# Patient Record
Sex: FEMALE | Race: WHITE | NOT HISPANIC OR LATINO | Employment: FULL TIME | RURAL
[De-identification: names, ages, dates, MRNs, and addresses within clinical notes are randomized per-mention and may not be internally consistent; named-entity substitution may affect disease eponyms.]

---

## 2017-03-28 ENCOUNTER — OFFICE VISIT (OUTPATIENT)
Dept: RETAIL CLINIC | Facility: CLINIC | Age: 42
End: 2017-03-28

## 2017-03-28 VITALS — RESPIRATION RATE: 20 BRPM | OXYGEN SATURATION: 100 % | TEMPERATURE: 98.6 F | WEIGHT: 154.2 LBS | HEART RATE: 91 BPM

## 2017-03-28 DIAGNOSIS — R11.2 NAUSEA AND VOMITING, INTRACTABILITY OF VOMITING NOT SPECIFIED, UNSPECIFIED VOMITING TYPE: Primary | ICD-10-CM

## 2017-03-28 DIAGNOSIS — R19.7 DIARRHEA, UNSPECIFIED TYPE: ICD-10-CM

## 2017-03-28 LAB
EXPIRATION DATE: NORMAL
FLUAV AG NPH QL: NORMAL
FLUBV AG NPH QL: NORMAL
INTERNAL CONTROL: NORMAL
Lab: NORMAL

## 2017-03-28 PROCEDURE — 99213 OFFICE O/P EST LOW 20 MIN: CPT | Performed by: NURSE PRACTITIONER

## 2017-03-28 PROCEDURE — 87804 INFLUENZA ASSAY W/OPTIC: CPT | Performed by: NURSE PRACTITIONER

## 2017-03-28 RX ORDER — ONDANSETRON 4 MG/1
4 TABLET, FILM COATED ORAL EVERY 8 HOURS PRN
Qty: 12 TABLET | Refills: 0 | Status: SHIPPED | OUTPATIENT
Start: 2017-03-28 | End: 2019-03-08

## 2017-03-28 RX ORDER — CITALOPRAM 20 MG/1
20 TABLET ORAL DAILY
COMMUNITY
End: 2019-03-08

## 2017-03-28 RX ORDER — OMEPRAZOLE 10 MG/1
10 CAPSULE, DELAYED RELEASE ORAL DAILY
COMMUNITY
End: 2019-03-08

## 2017-03-28 RX ORDER — LORATADINE 10 MG/1
10 TABLET ORAL DAILY
COMMUNITY
End: 2019-03-26

## 2017-03-28 NOTE — PATIENT INSTRUCTIONS
Diarrhea  Diarrhea is watery poop (stool). It can make you feel weak, tired, thirsty, or give you a dry mouth (signs of dehydration). Watery poop is a sign of another problem, most often an infection. It often lasts 2-3 days. It can last longer if it is a sign of something serious. Take care of yourself as told by your doctor.  HOME CARE   · Drink 1 cup (8 ounces) of fluid each time you have watery poop.  · Do not drink the following fluids:    Those that contain simple sugars (fructose, glucose, galactose, lactose, sucrose, maltose).    Sports drinks.    Fruit juices.    Whole milk products.    Sodas.    Drinks with caffeine (coffee, tea, soda) or alcohol.  · Oral rehydration solution may be used if the doctor says it is okay. You may make your own solution. Follow this recipe:    ?-? teaspoon table salt.    ¾ teaspoon baking soda.    ? teaspoon salt substitute containing potassium chloride.    1 ? tablespoons sugar.    1 liter (34 ounces) of water.  · Avoid the following foods:    High fiber foods, such as raw fruits and vegetables.    Nuts, seeds, and whole grain breads and cereals.     Those that are sweetened with sugar alcohols (xylitol, sorbitol, mannitol).  · Try eating the following foods:    Starchy foods, such as rice, toast, pasta, low-sugar cereal, oatmeal, baked potatoes, crackers, and bagels.    Bananas.    Applesauce.  · Eat probiotic-rich foods, such as yogurt and milk products that are fermented.  · Wash your hands well after each time you have watery poop.  · Only take medicine as told by your doctor.  · Take a warm bath to help lessen burning or pain from having watery poop.  GET HELP RIGHT AWAY IF:   · You cannot drink fluids without throwing up (vomiting).  · You keep throwing up.  · You have blood in your poop, or your poop looks black and tarry.  · You do not pee (urinate) in 6-8 hours, or there is only a small amount of very dark pee.  · You have belly (abdominal) pain that gets worse or  stays in the same spot (localizes).  · You are weak, dizzy, confused, or light-headed.  · You have a very bad headache.  · Your watery poop gets worse or does not get better.  · You have a fever or lasting symptoms for more than 2-3 days.  · You have a fever and your symptoms suddenly get worse.  MAKE SURE YOU:   · Understand these instructions.  · Will watch your condition.  · Will get help right away if you are not doing well or get worse.     This information is not intended to replace advice given to you by your health care provider. Make sure you discuss any questions you have with your health care provider.     Document Released: 06/05/2009 Document Revised: 01/08/2016 Document Reviewed: 08/23/2016  [employer] Interactive Patient Education ©2016 [employer]    Nausea and Vomiting  Nausea means you feel sick to your stomach. Throwing up (vomiting) is a reflex where stomach contents come out of your mouth.  HOME CARE   · Take medicine as told by your doctor.  · Do not force yourself to eat. However, you do need to drink fluids.  · If you feel like eating, eat a normal diet as told by your doctor.    Eat rice, wheat, potatoes, bread, lean meats, yogurt, fruits, and vegetables.    Avoid high-fat foods.  · Drink enough fluids to keep your pee (urine) clear or pale yellow.  · Ask your doctor how to replace body fluid losses (rehydrate). Signs of body fluid loss (dehydration) include:    Feeling very thirsty.    Dry lips and mouth.    Feeling dizzy.    Dark pee.    Peeing less than normal.    Feeling confused.    Fast breathing or heart rate.  GET HELP RIGHT AWAY IF:   · You have blood in your throw up.  · You have black or bloody poop (stool).  · You have a bad headache or stiff neck.  · You feel confused.  · You have bad belly (abdominal) pain.  · You have chest pain or trouble breathing.  · You do not pee at least once every 8 hours.  · You have cold, clammy skin.  · You keep throwing up after 24 to 48  hours.  · You have a fever.  MAKE SURE YOU:   · Understand these instructions.  · Will watch your condition.  · Will get help right away if you are not doing well or get worse.     This information is not intended to replace advice given to you by your health care provider. Make sure you discuss any questions you have with your health care provider.     Document Released: 06/05/2009 Document Revised: 03/11/2013 Document Reviewed: 08/23/2016  [employer] Interactive Patient Education ©2016 [employer]

## 2017-03-28 NOTE — PROGRESS NOTES
Subjective   Carlota Apodaca is a 41 y.o. female.   Chief Complaint   Patient presents with   • Nausea   • Diarrhea      Nausea   This is a new problem. The current episode started yesterday. The problem occurs 2 to 4 times per day. The problem has been waxing and waning. Associated symptoms include nausea and vomiting. Pertinent negatives include no abdominal pain, coughing, fever or myalgias. Associated symptoms comments: diarrhea. Nothing aggravates the symptoms. She has tried nothing for the symptoms.   Diarrhea    This is a new problem. The current episode started yesterday. The problem occurs 5 to 10 times per day. The problem has been unchanged. The stool consistency is described as watery. Associated symptoms include vomiting. Pertinent negatives include no abdominal pain, coughing, fever or myalgias. Nothing aggravates the symptoms. There are no known risk factors. The treatment provided no relief.        The following portions of the patient's history were reviewed and updated as appropriate: allergies, current medications, past family history, past medical history, past social history, past surgical history and problem list.    Review of Systems   Constitutional: Negative.  Negative for fever.   HENT: Negative.    Eyes: Negative.    Respiratory: Negative.  Negative for cough.    Gastrointestinal: Positive for diarrhea, nausea and vomiting. Negative for abdominal distention and abdominal pain.   Genitourinary: Negative.    Musculoskeletal: Negative for myalgias.   Skin: Negative.    Allergic/Immunologic: Negative.    Psychiatric/Behavioral: Negative.    All other systems reviewed and are negative.      Objective   Allergies   Allergen Reactions   • Hydrocodone Other (See Comments)     Exact reaction unknown       Physical Exam   Constitutional: She is oriented to person, place, and time. She appears well-developed and well-nourished. She appears ill.   HENT:   Right Ear: Tympanic membrane normal.   Left Ear:  Tympanic membrane normal.   Nose: Nose normal.   Mouth/Throat: Oropharynx is clear and moist.   Eyes: Pupils are equal, round, and reactive to light.   Neck: Neck supple.   Cardiovascular: Normal rate and regular rhythm.    Pulmonary/Chest: Effort normal and breath sounds normal.   Abdominal: Soft. Bowel sounds are increased. There is no tenderness. There is no rigidity, no rebound, no guarding and no CVA tenderness.   Musculoskeletal: Normal range of motion.   Neurological: She is alert and oriented to person, place, and time.   Skin: Skin is warm and dry.   Psychiatric: She has a normal mood and affect.   Vitals reviewed.      Assessment/Plan   Carlota was seen today for nausea and diarrhea.    Diagnoses and all orders for this visit:    Nausea and vomiting, intractability of vomiting not specified, unspecified vomiting type  -     POC Influenza A / B    Diarrhea, unspecified type  -     POC Influenza A / B    Other orders  -     ondansetron (ZOFRAN) 4 MG tablet; Take 1 tablet by mouth Every 8 (Eight) Hours As Needed for Nausea or Vomiting.               Results for orders placed or performed in visit on 03/28/17   POC Influenza A / B   Result Value Ref Range    Rapid Influenza A Ag neg     Rapid Influenza B Ag neg     Internal Control Passed Passed    Lot Number [id number]     Expiration Date 12/2018        This document has been electronically signed by ZOE Harper March 28, 2017 5:07 PM

## 2018-06-26 ENCOUNTER — HOSPITAL ENCOUNTER (OUTPATIENT)
Dept: OCCUPATIONAL THERAPY | Facility: HOSPITAL | Age: 43
Setting detail: THERAPIES SERIES
Discharge: HOME | End: 2018-06-26

## 2018-06-26 DIAGNOSIS — M79.601 RIGHT ARM PAIN: Primary | ICD-10-CM

## 2018-06-26 PROCEDURE — 97167 OT EVAL HIGH COMPLEX 60 MIN: CPT | Performed by: OCCUPATIONAL THERAPIST

## 2018-06-26 NOTE — PROGRESS NOTES
Outpatient Occupational Therapy Ortho Initial Evaluation   [facility]     Patient Name: Carlota Apodaca  : 1975  MRN: [id number]  Today's Date: 2018      Visit Date: 2018    There is no problem list on file for this patient.       Past Medical History:   Diagnosis Date   • Acid reflux    • Depression    • Elevated cholesterol         Past Surgical History:   Procedure Laterality Date   •  SECTION     • OVARY SURGERY Right     Removed Cyst and Ovary         Visit Dx:    ICD-10-CM ICD-9-CM   1. Right arm pain M79.601 729.5             Patient History     Row Name 18 1300             History    Chief Complaint Difficulty with daily activities;Muscle weakness;Pain  -AH      Type of Pain Upper Extremity / Arm  -AH      Date Current Problem(s) Began 18  -      Brief Description of Current Complaint patient reports increasing pain in right arm/elbow/forearm area over last month.  She states she has a dull ache most of the time with radiating pain as well.  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  she reports she often uses right arm  to help guide the children and at times by holding onto her right elbow  -      Patient/Caregiver Goals Return to prior level of function  -      Current Tobacco Use no  -      Smoking Status no  -      Patient's Rating of General Health Very good  -      Hand Dominance right-handed  -      Occupation/sports/leisure activities    -         Pain     Pain Location Arm  -      Pain at Present 5;6  -      Pain at Best 0;5;6  -AH      Pain at Worst 5;6  -AH      Pain Frequency Constant/continuous  -      Pain Description Aching  -      Pain Comments increases with lifting objects right  -         Fall Risk Assessment    Any falls in the past year: No  -         Daily Activities    Primary Language English  -      Pt Participated in  POC and Goals Yes  -         Safety    Are you being hurt, hit, or frightened by anyone at home or in your life? No  -AH      Are you being neglected by a caregiver No  -        User Key  (r) = Recorded By, (t) = Taken By, (c) = Cosigned By    Initials Name Provider Type     Ericka Whitaker, OT Occupational Therapist                OT Ortho     Row Name 06/26/18 1300             Sensation    Sensation WNL? WFL  -      Additional Comments numbness and tingling noted at times around forearm elbow area  -         General ROM    RT Upper Ext --   WFL  -AH      GENERAL ROM COMMENTS pain with wrist extension and supination, elbow extension  -         MMT (Manual Muscle Testing)    Additional Documentation --   4-/5  -AH         Girth    Girth Measured? --   mild /mod edema noted right medial elbow area  -        User Key  (r) = Recorded By, (t) = Taken By, (c) = Cosigned By    Initials Name Provider Type     Ericka Whitaker OT Occupational Therapist              OT Neuro     Row Name 06/26/18 1300             Coordination    9-Hole Peg Left 22  -AH      9-Hole Peg Right 19  -AH        User Key  (r) = Recorded By, (t) = Taken By, (c) = Cosigned By    Initials Name Provider Type     Ericka Whitaker OT Occupational Therapist           Hand Therapy (last 24 hours)      Hand Eval     Row Name 06/26/18 1300              Strength Right    Right  Test 1 40  -AH      Right  Test 2 45  -AH      Right  Test 3 45  -AH       Strength Average Right 43.33  -AH          Strength Left    Left  Test 1 55  -AH      Left  Test 2 52  -AH      Left  Test 3 58  -AH       Strength Average Left 55  -AH         Right Hand Strength - Pinch (lbs)    Lateral 13 lbs  -AH      Three Jaw Urban 6 lbs  -AH         Left Hand Strength - Pinch (lbs)    L Hand Pinch Strength 14  -AH      Three Jaw Urban 10 lbs  -AH         Therapy Education    Education Details --   ROM  -AH         User Key  (r) = Recorded By, (t) = Taken By, (c) = Cosigned By    Initials Name Provider Type     Ericka Whitaker, OT Occupational Therapist              Therapy Education  Education Details: ROM  Given: Symptoms/condition management, HEP  Program: New  How Provided: Verbal, Demonstration, Written  Provided to: Patient, Caregiver  Level of Understanding: Teach back education performed, Verbalized, Demonstrated          OT Goals     Row Name 06/26/18 1300          OT Short Term Goals    STG 1 patient will increase right  5-7 lbs.  -     STG 2 patient will increase right pinch 2-3 lbs.  -     STG 3 patient will report decreased pain to 4 or below right arm with use  -     STG 4 patient will increase endurance right UE to complete 30 min activity with no increase in symptoms  -        Long Term Goals    LTG 1 patient will be independent with HEP as appropriate  -     LTG 2 patient will increase right strength throughout to increase functional use  -     LTG 3 patient will increase right fmc to increase functional use.  -     LTG 4 patient will report decreased pain with right use to 2 or below  -     LTG 5 patient will increase endurance right UE to complete 45-60 min activity with no increase in symptoms.  -       User Key  (r) = Recorded By, (t) = Taken By, (c) = Cosigned By    Initials Name Provider Type     Ericka Whitaker OT Occupational Therapist                OT Assessment/Plan     Row Name 06/26/18 1329          OT Assessment    Impairments Edema;Endurance;Muscle strength;Pain  -     Assessment Comments patient demonstrates decreased strength, complaints of pain, edema and decreased endurance right UE  -     Please refer to paper survey for additional self-reported information Yes  -     OT Rehab Potential Good  -     Patient/caregiver participated in establishment of treatment plan and goals Yes  -     Patient would benefit from skilled therapy intervention Yes   -        OT Plan    OT Frequency 2x/week  -     Predicted Duration of Therapy Intervention (Therapy Eval) 4 weeks  -     Planned CPT's? OT EVAL HIGH COMPLEXITY: 88703;OT THER ACT EA 15 MIN: 69191UC;OT THER PROC EA 15 MIN: 58612DU;OT HOT/COLD PACK;OT PARAFFIN BATH: 64199NV;OT CARE PLAN EA 15 MIN  -     Planned Therapy Interventions (Optional Details) home exercise program;motor coordination training;patient/family education;ROM (Range of Motion);strengthening;stretching  -     OT Plan Comments OT as planned  -       User Key  (r) = Recorded By, (t) = Taken By, (c) = Cosigned By    Initials Name Provider Type     Ericka Whitaker OT Occupational Therapist                   9 Hole Peg  9-Hole Peg Left: 22  9-Hole Peg Right: 19         Time Calculation:    OT Start Time: 1100  OT Stop Time: 1200  OT Time Calculation (min): 60 min     Therapy Suggested Charges     Code   Minutes Charges    None             Therapy Charges for Today     Code Description Service Date Service Provider Modifiers Qty    72785805877  OT EVAL HIGH COMPLEXITY 4 6/26/2018 Ericka Whitaker OT GO 1                  Ericka Whitaker OT  6/26/2018

## 2018-06-27 ENCOUNTER — TRANSCRIBE ORDERS (OUTPATIENT)
Dept: PHYSICAL THERAPY | Facility: HOSPITAL | Age: 43
End: 2018-06-27

## 2018-06-27 ENCOUNTER — HOSPITAL ENCOUNTER (OUTPATIENT)
Dept: OCCUPATIONAL THERAPY | Facility: HOSPITAL | Age: 43
Setting detail: THERAPIES SERIES
Discharge: HOME | End: 2018-06-27

## 2018-06-27 DIAGNOSIS — M79.631 PAIN OF RIGHT FOREARM: Primary | ICD-10-CM

## 2018-06-27 DIAGNOSIS — M79.601 RIGHT ARM PAIN: Primary | ICD-10-CM

## 2018-06-27 PROCEDURE — 97530 THERAPEUTIC ACTIVITIES: CPT | Performed by: OCCUPATIONAL THERAPIST

## 2018-06-27 PROCEDURE — 97035 APP MDLTY 1+ULTRASOUND EA 15: CPT | Performed by: OCCUPATIONAL THERAPIST

## 2018-06-27 NOTE — PROGRESS NOTES
Outpatient Occupational Therapy Ortho Treatment Note   [facility]     Patient Name: Carlota Apodaca  : 1975  MRN: [id number]  Today's Date: 2018        Visit Date: 2018    There is no problem list on file for this patient.       Past Medical History:   Diagnosis Date   • Acid reflux    • Depression    • Elevated cholesterol         Past Surgical History:   Procedure Laterality Date   •  SECTION     • OVARY SURGERY Right     Removed Cyst and Ovary         Visit Dx:    ICD-10-CM ICD-9-CM   1. Right arm pain M79.601 729.5                              OT Assessment/Plan     Row Name 18 1625          OT Assessment    Assessment Comments patient tolerated therapy well with no complaints  -AH        OT Plan    OT Plan Comments OT as planned  -       User Key  (r) = Recorded By, (t) = Taken By, (c) = Cosigned By    Initials Name Provider Type     Ericka Whitaker OT Occupational Therapist                     Modalities     Row Name 18 1500             Subjective Comments    Subjective Comments patient tolerated therapy well, pat states exercises are going well  -AH         Moist Heat    MH Applied Yes  -AH      Location right elbow  -AH         Ice    Ice Applied --   right elbow  -AH      Location right elbow  -AH         Ultrasound 28128    Location right elbow  -AH      Duty Cycle 50  -AH      Frequency 3.0 MHz  -AH        User Key  (r) = Recorded By, (t) = Taken By, (c) = Cosigned By    Initials Name Provider Type     Ericka Whitaker, OT Occupational Therapist                OT Exercises     Row Name 18 1600             Exercise 1    Exercise Name 1 massage  -AH         Exercise 2    Exercise Name 2 AROM shoulder ladder x3  -AH         Exercise 3    Exercise Name 3 towel slides 2 min x2  -AH         Exercise 4    Exercise Name 4 handgripper 20x3  -AH         Exercise 5    Exercise Name 5 red digiflex 20x3  -AH        User Key  (r) = Recorded By, (t) = Taken By, (c)  = Cosigned By    Initials Name Provider Type     Ericka Whitaker OT Occupational Therapist                          Time Calculation:   OT Start Time: 1500  OT Stop Time: 1600  OT Time Calculation (min): 60 min     Therapy Suggested Charges     Code   Minutes Charges    None             Therapy Charges for Today     Code Description Service Date Service Provider Modifiers Qty    71206005818  OT ULTRASOUND EA 15 MIN 6/27/2018 Ericka Whitaker, SANCHEZ GO 1    [id number]  OT THERAPEUTIC ACT EA 15 MIN 6/27/2018 Ericka Whitaker OT GO 2                    Ericka Whitaker OT  6/27/2018

## 2018-07-03 ENCOUNTER — HOSPITAL ENCOUNTER (OUTPATIENT)
Dept: OCCUPATIONAL THERAPY | Facility: HOSPITAL | Age: 43
Setting detail: THERAPIES SERIES
Discharge: HOME | End: 2018-07-03

## 2018-07-03 DIAGNOSIS — M79.601 RIGHT ARM PAIN: Primary | ICD-10-CM

## 2018-07-03 PROCEDURE — 97530 THERAPEUTIC ACTIVITIES: CPT | Performed by: OCCUPATIONAL THERAPIST

## 2018-07-03 PROCEDURE — 97035 APP MDLTY 1+ULTRASOUND EA 15: CPT | Performed by: OCCUPATIONAL THERAPIST

## 2018-07-03 NOTE — THERAPY TREATMENT NOTE
Outpatient Occupational Therapy Hand Treatment Note  [facility]     Patient Name: Carlota Apodaca  : 1975  MRN: [id number]  Today's Date: 7/3/2018         Visit Date: 2018  There is no problem list on file for this patient.        Visit Dx:    ICD-10-CM ICD-9-CM   1. Right arm pain M79.601 729.5                         OT Assessment/Plan     Row Name 18 1531          OT Assessment    Assessment Comments patient demonstrates decreased pain right elbow  -AH        OT Plan    OT Plan Comments continue OT  -AH       User Key  (r) = Recorded By, (t) = Taken By, (c) = Cosigned By    Initials Name Provider Type     Ericka Whitaker OT Occupational Therapist                Modalities     Row Name 18 1500             Subjective Comments    Subjective Comments patient states her right elbow feels much better  -AH         Moist Heat    MH Applied Yes  -AH      Location right elbow  -AH         Ice    Ice Applied Yes  -AH      Location right elbow  -AH         Ultrasound 70795    Location right elbow medial  -AH      Duty Cycle 50  -AH      Frequency 3.0 MHz  -AH        User Key  (r) = Recorded By, (t) = Taken By, (c) = Cosigned By    Initials Name Provider Type     Ericka Whitaker OT Occupational Therapist                OT Exercises     Row Name 18 1500             Exercise 1    Exercise Name 1 massage  -AH         Exercise 2    Exercise Name 2 AROM finger ladder x5  -AH         Exercise 3    Exercise Name 3 towel slides 2 min x2  -AH         Exercise 4    Exercise Name 4 handgripper 20x3  -AH         Exercise 5    Exercise Name 5 wrist rolls x2  -AH        User Key  (r) = Recorded By, (t) = Taken By, (c) = Cosigned By    Initials Name Provider Type     Ericka Whitaker OT Occupational Therapist                                  Time Calculation:   OT Start Time: 1000  OT Stop Time: 1100  OT Time Calculation (min): 60 min     Therapy Suggested Charges     Code   Minutes Charges     None             Therapy Charges for Today     Code Description Service Date Service Provider Modifiers Qty    61049653839  OT THERAPEUTIC ACT EA 15 MIN 7/3/2018 Ericka Whitaker OT GO 2    [id number]  OT ULTRASOUND EA 15 MIN 7/3/2018 Ericka Whitaker OT GO 1                  Ericka Whitaker OT  7/3/2018

## 2018-07-05 ENCOUNTER — HOSPITAL ENCOUNTER (OUTPATIENT)
Dept: OCCUPATIONAL THERAPY | Facility: HOSPITAL | Age: 43
Setting detail: THERAPIES SERIES
Discharge: HOME | End: 2018-07-05

## 2018-07-05 DIAGNOSIS — M79.601 RIGHT ARM PAIN: Primary | ICD-10-CM

## 2018-07-05 PROCEDURE — 97035 APP MDLTY 1+ULTRASOUND EA 15: CPT | Performed by: OCCUPATIONAL THERAPIST

## 2018-07-05 PROCEDURE — 97530 THERAPEUTIC ACTIVITIES: CPT | Performed by: OCCUPATIONAL THERAPIST

## 2018-07-05 NOTE — PROGRESS NOTES
Outpatient Occupational Therapy Ortho Treatment Note   [facility]     Patient Name: Carlota Apodaca  : 1975  MRN: [id number]  Today's Date: 2018        Visit Date: 2018    There is no problem list on file for this patient.       Past Medical History:   Diagnosis Date   • Acid reflux    • Depression    • Elevated cholesterol         Past Surgical History:   Procedure Laterality Date   •  SECTION     • OVARY SURGERY Right     Removed Cyst and Ovary         Visit Dx:    ICD-10-CM ICD-9-CM   1. Right arm pain M79.601 729.5                              OT Assessment/Plan     Row Name 18 1544          OT Assessment    Assessment Comments patient demonstrates increasing strength and endurance  -AH        OT Plan    OT Plan Comments continue OT  -AH       User Key  (r) = Recorded By, (t) = Taken By, (c) = Cosigned By    Initials Name Provider Type    CHENCHO Whitaker OT Occupational Therapist                     Modalities     Row Name 18 1500             Subjective Comments    Subjective Comments patient states right elbow is still doing better  -AH         Moist Heat    MH Applied Yes  -AH      Location right elbow  -AH         Ice    Ice Applied Yes  -AH      Location right elbow  -AH         Ultrasound 77428    Location right medial elbow  -AH      Duty Cycle 50  -AH      Frequency 3.0 MHz  -AH        User Key  (r) = Recorded By, (t) = Taken By, (c) = Cosigned By    Initials Name Provider Type    CHENCHO Whitaker OT Occupational Therapist                OT Exercises     Row Name 18 1500             Exercise 1    Exercise Name 1 massage  -AH         Exercise 2    Exercise Name 2 AROM wrist rolls  -AH         Exercise 3    Exercise Name 3 towel slides 2min x2  -AH         Exercise 4    Exercise Name 4 handgripper 20x3  -AH         Exercise 5    Exercise Name 5 wrist rolls x2  -AH         Exercise 6    Exercise Name 6 graded pins x2  -AH        User Key  (r) = Recorded  By, (t) = Taken By, (c) = Cosigned By    Initials Name Provider Type     Ericka Whitaker OT Occupational Therapist                          Time Calculation:   OT Start Time: 1015  OT Stop Time: 1115  OT Time Calculation (min): 60 min     Therapy Suggested Charges     Code   Minutes Charges    None             Therapy Charges for Today     Code Description Service Date Service Provider Modifiers Qty    [id number]  OT THERAPEUTIC ACT EA 15 MIN 7/5/2018 Ericka Whitaker OT GO 2    [id number]  OT ULTRASOUND EA 15 MIN 7/5/2018 Ericka Whitaker OT GO 1                    Ericka Whitaker OT  7/5/2018

## 2018-07-10 ENCOUNTER — HOSPITAL ENCOUNTER (OUTPATIENT)
Dept: OCCUPATIONAL THERAPY | Facility: HOSPITAL | Age: 43
Setting detail: THERAPIES SERIES
Discharge: HOME | End: 2018-07-10

## 2018-07-10 DIAGNOSIS — M79.601 RIGHT ARM PAIN: Primary | ICD-10-CM

## 2018-07-10 PROCEDURE — 97035 APP MDLTY 1+ULTRASOUND EA 15: CPT | Performed by: OCCUPATIONAL THERAPIST

## 2018-07-10 PROCEDURE — 97530 THERAPEUTIC ACTIVITIES: CPT | Performed by: OCCUPATIONAL THERAPIST

## 2018-07-10 NOTE — PROGRESS NOTES
Outpatient Occupational Therapy Ortho Treatment Note   [place]     Patient Name: Carlota Apodaca  : 1975  MRN: [id number]  Today's Date: 7/10/2018        Visit Date: 07/10/2018    There is no problem list on file for this patient.       Past Medical History:   Diagnosis Date   • Acid reflux    • Depression    • Elevated cholesterol         Past Surgical History:   Procedure Laterality Date   •  SECTION     • OVARY SURGERY Right     Removed Cyst and Ovary         Visit Dx:    ICD-10-CM ICD-9-CM   1. Right arm pain M79.601 729.5                              OT Assessment/Plan     Row Name 07/10/18 1424          OT Assessment    Assessment Comments patient demonstrates increasing strength right   -AH        OT Plan    OT Plan Comments continue OT  -AH       User Key  (r) = Recorded By, (t) = Taken By, (c) = Cosigned By    Initials Name Provider Type    CHENCHO Whitaker OT Occupational Therapist                     Modalities     Row Name 07/10/18 1300             Moist Heat    MH Applied Yes  -AH      Location right elbow  -AH         Ice    Ice Applied Yes  -AH      Location right elbow  -AH         Ultrasound 57443    Location right elbow  -AH      Duty Cycle 50  -AH      Frequency 3.0 MHz  -        User Key  (r) = Recorded By, (t) = Taken By, (c) = Cosigned By    Initials Name Provider Type    CHENCHO Whitaker OT Occupational Therapist                OT Exercises     Row Name 07/10/18 1400             Exercise 1    Exercise Name 1 massage  -AH         Exercise 2    Exercise Name 2 AROM elbow slides  -AH         Exercise 3    Exercise Name 3 graded pinsx 3  -AH         Exercise 4    Exercise Name 4 handgripper right  -AH         Exercise 5    Exercise Name 5 wrist rolls x3  -AH        User Key  (r) = Recorded By, (t) = Taken By, (c) = Cosigned By    Initials Name Provider Type    CHENCHO Whitaker OT Occupational Therapist                          Time Calculation:   OT Start  Time: 1000  OT Stop Time: 1100  OT Time Calculation (min): 60 min     Therapy Suggested Charges     Code   Minutes Charges    None             Therapy Charges for Today     Code Description Service Date Service Provider Modifiers Qty    60873795997  OT ULTRASOUND EA 15 MIN 7/10/2018 Ericka Whitaker OT GO 1    [id number]  OT THERAPEUTIC ACT EA 15 MIN 7/10/2018 Ericka Whitaker OT GO 2                    Ericka Whitaker OT  7/10/2018

## 2018-07-12 ENCOUNTER — HOSPITAL ENCOUNTER (OUTPATIENT)
Dept: OCCUPATIONAL THERAPY | Facility: HOSPITAL | Age: 43
Setting detail: THERAPIES SERIES
Discharge: HOME | End: 2018-07-12

## 2018-07-12 DIAGNOSIS — M79.601 RIGHT ARM PAIN: Primary | ICD-10-CM

## 2018-07-12 PROCEDURE — 97530 THERAPEUTIC ACTIVITIES: CPT | Performed by: OCCUPATIONAL THERAPIST

## 2018-07-12 PROCEDURE — 97035 APP MDLTY 1+ULTRASOUND EA 15: CPT | Performed by: OCCUPATIONAL THERAPIST

## 2018-07-12 NOTE — PROGRESS NOTES
Outpatient Occupational Therapy Ortho Treatment Note   [place]     Patient Name: Carlota Apodaca  : 1975  MRN: [id number]  Today's Date: 2018        Visit Date: 2018    There is no problem list on file for this patient.       Past Medical History:   Diagnosis Date   • Acid reflux    • Depression    • Elevated cholesterol         Past Surgical History:   Procedure Laterality Date   •  SECTION     • OVARY SURGERY Right     Removed Cyst and Ovary         Visit Dx:    ICD-10-CM ICD-9-CM   1. Right arm pain M79.601 729.5                              OT Assessment/Plan     Row Name 18 1513          OT Assessment    Assessment Comments patient continues to demonstrate increased right elbow function  -AH        OT Plan    OT Plan Comments continue OT  -AH       User Key  (r) = Recorded By, (t) = Taken By, (c) = Cosigned By    Initials Name Provider Type    CHENCHO Whitaker OT Occupational Therapist                     Modalities     Row Name 18 1500             Moist Heat    MH Applied Yes  -AH      Location right elbow  -AH         Ice    Ice Applied Yes  -AH      Location right elbow  -AH         Ultrasound 57351    Location right elbow  -AH      Duty Cycle 50  -AH      Frequency 3.0 MHz  -AH        User Key  (r) = Recorded By, (t) = Taken By, (c) = Cosigned By    Initials Name Provider Type     Ericka Whitaker, SANCHEZ Occupational Therapist                OT Exercises     Row Name 18 1500             Exercise 1    Exercise Name 1 massage  -AH         Exercise 2    Exercise Name 2 AROM table top slides  -AH         Exercise 3    Exercise Name 3 graded pins x3  -AH         Exercise 4    Exercise Name 4 handgripper right  -AH         Exercise 5    Exercise Name 5 wrist rolls x3  -AH         Exercise 6    Exercise Name 6 UE bike 3 min x2  -AH        User Key  (r) = Recorded By, (t) = Taken By, (c) = Cosigned By    Initials Name Provider Type     Ericka Whitaker, OT  Occupational Therapist                          Time Calculation:   OT Start Time: 0950  OT Stop Time: 1100  OT Time Calculation (min): 70 min     Therapy Suggested Charges     Code   Minutes Charges    None             Therapy Charges for Today     Code Description Service Date Service Provider Modifiers Qty    83510691686  OT THERAPEUTIC ACT EA 15 MIN 7/12/2018 Ericka Whitaker OT GO 3    [id number]  OT ULTRASOUND EA 15 MIN 7/12/2018 Ericka Whitaker OT GO 1                    Ericka Whitaker OT  7/12/2018

## 2018-07-17 ENCOUNTER — HOSPITAL ENCOUNTER (OUTPATIENT)
Dept: OCCUPATIONAL THERAPY | Facility: HOSPITAL | Age: 43
Setting detail: THERAPIES SERIES
Discharge: HOME | End: 2018-07-17

## 2018-07-17 DIAGNOSIS — M79.601 RIGHT ARM PAIN: Primary | ICD-10-CM

## 2018-07-17 PROCEDURE — 97530 THERAPEUTIC ACTIVITIES: CPT | Performed by: OCCUPATIONAL THERAPIST

## 2018-07-17 PROCEDURE — 97035 APP MDLTY 1+ULTRASOUND EA 15: CPT | Performed by: OCCUPATIONAL THERAPIST

## 2018-07-17 NOTE — THERAPY RE-EVALUATION
Outpatient Occupational Therapy Ortho Re-Evaluation   [place]     Patient Name: Carlota Apodaca  : 1975  MRN: [id number]  Today's Date: 2018      Visit Date: 2018    There is no problem list on file for this patient.       Past Medical History:   Diagnosis Date   • Acid reflux    • Depression    • Elevated cholesterol         Past Surgical History:   Procedure Laterality Date   •  SECTION     • OVARY SURGERY Right     Removed Cyst and Ovary         Visit Dx:    ICD-10-CM ICD-9-CM   1. Right arm pain M79.601 729.5                  Hand Therapy (last 24 hours)      Hand Eval     Row Name 18 1400              Strength Right    Right  Test 1 48  -AH      Right  Test 2 48  -AH      Right  Test 3 53  -AH       Strength Average Right 49.67  -AH          Strength Left    Left  Test 1 55  -AH      Left  Test 2 55  -AH      Left  Test 3 58  -AH       Strength Average Left 56  -AH         Right Hand Strength - Pinch (lbs)    Lateral 13 lbs  -AH      Three Jaw Urban 10 lbs  -AH        User Key  (r) = Recorded By, (t) = Taken By, (c) = Cosigned By    Initials Name Provider Type     Ericka Whitaker, OT Occupational Therapist                         OT Goals     Row Name 18 1500          OT Short Term Goals    STG 1 Progress Partially Met  -AH     STG 2 Progress Partially Met  -AH     STG 3 Progress Partially Met  -AH     STG 4 Progress Partially Met  -AH        Long Term Goals    LTG 1 Progress Progressing  -AH     LTG 2 Progress Progressing  -AH     LTG 3 Progress Progressing  -AH     LTG 4 Progress Progressing  -AH     LTG 5 Progress Progressing  -AH       User Key  (r) = Recorded By, (t) = Taken By, (c) = Cosigned By    Initials Name Provider Type     Ericka Whitaker OT Occupational Therapist                OT Assessment/Plan     Row Name 18 1504          OT Assessment    Assessment Comments patient demonstrating increasing right  arm strength and endurance, decreased pain overall but pain still reported at 6 at times with certain movements  -     OT Rehab Potential Good  -     Patient/caregiver participated in establishment of treatment plan and goals Yes  -     Patient would benefit from skilled therapy intervention Yes  -        OT Plan    OT Frequency 2x/week  -     Predicted Duration of Therapy Intervention (Therapy Eval) 2 weeks  -     Planned CPT's? OT EVAL HIGH COMPLEXITY: 17745;OT THER PROC EA 15 MIN: [id number];OT THER ACT EA 15 MIN: 33128SV;OT HOT/COLD PACK;OT ULTRASOUND EA 15 MIN: 75354;OT CARE PLAN EA 15 MIN  -     Planned Therapy Interventions (Optional Details) home exercise program;motor coordination training;patient/family education;ROM (Range of Motion);strengthening;stretching  -     OT Plan Comments Ot as planned 2 additional weeks  [facility]       User Key  (r) = Recorded By, (t) = Taken By, (c) = Cosigned By    Initials Name Provider Type     Ericka Whitaker, OT Occupational Therapist                 OT Exercises     Row Name 07/17/18 1400             Exercise 1    Exercise Name 1 massage  -         Exercise 2    Exercise Name 2 AROM table top slides  -         Exercise 3    Exercise Name 3 graded pins x3  -         Exercise 4    Exercise Name 4 handgripper right 20x5  -         Exercise 5    Exercise Name 5 wrist rolls x2  -         Exercise 6    Exercise Name 6 UE bike 3 min x2  -        User Key  (r) = Recorded By, (t) = Taken By, (c) = Cosigned By    Initials Name Provider Type     Ericka Whitaker, OT Occupational Therapist                        Time Calculation:    OT Start Time: 1000  OT Stop Time: 1100  OT Time Calculation (min): 60 min     Therapy Suggested Charges     Code   Minutes Charges    None             Therapy Charges for Today     Code Description Service Date Service Provider Modifiers Qty    77461883957  OT THERAPEUTIC ACT EA 15 MIN 7/17/2018 Ericka Whitaker,  OT GO 3    [id number]  OT ULTRASOUND EA 15 MIN 7/17/2018 Ericka Whitaker, OT GO 1                  Ericka Whitaker, OT  7/17/2018

## 2018-07-26 ENCOUNTER — HOSPITAL ENCOUNTER (OUTPATIENT)
Dept: OCCUPATIONAL THERAPY | Facility: HOSPITAL | Age: 43
Setting detail: THERAPIES SERIES
Discharge: HOME | End: 2018-07-26

## 2018-07-26 DIAGNOSIS — M79.601 RIGHT ARM PAIN: Primary | ICD-10-CM

## 2018-07-26 PROCEDURE — 97035 APP MDLTY 1+ULTRASOUND EA 15: CPT | Performed by: OCCUPATIONAL THERAPIST

## 2018-07-26 PROCEDURE — 97530 THERAPEUTIC ACTIVITIES: CPT | Performed by: OCCUPATIONAL THERAPIST

## 2018-07-26 NOTE — PROGRESS NOTES
Outpatient Occupational Therapy Ortho Treatment Note   [place]     Patient Name: Carlota Apodaca  : 1975  MRN: [id number]  Today's Date: 2018        Visit Date: 2018    There is no problem list on file for this patient.       Past Medical History:   Diagnosis Date   • Acid reflux    • Depression    • Elevated cholesterol         Past Surgical History:   Procedure Laterality Date   •  SECTION     • OVARY SURGERY Right     Removed Cyst and Ovary         Visit Dx:    ICD-10-CM ICD-9-CM   1. Right arm pain M79.601 729.5                              OT Assessment/Plan     Row Name 18 1533          OT Assessment    Assessment Comments patient tolerated theray well reports some increased stiffness right arm today  -AH        OT Plan    OT Plan Comments continue OT  -AH       User Key  (r) = Recorded By, (t) = Taken By, (c) = Cosigned By    Initials Name Provider Type     Ericka Whitaker OT Occupational Therapist                     Modalities     Row Name 18 1500             Subjective Comments    Subjective Comments patient states her right elbow has been stiffer past few days  -AH         Moist Heat    MH Applied Yes  -AH      Location right elbow  -AH         Ultrasound 04104    Location right elbow forearm  -AH      Duty Cycle 50  -AH      Frequency 3.0 MHz  -AH        User Key  (r) = Recorded By, (t) = Taken By, (c) = Cosigned By    Initials Name Provider Type    CHENCHO Whitaker OT Occupational Therapist                OT Exercises     Row Name 18 1500             Exercise 1    Exercise Name 1 massage  -AH         Exercise 2    Exercise Name 2 AROM table top slides 2 min x3  -AH         Exercise 3    Exercise Name 3 graded pins x3  -AH         Exercise 4    Exercise Name 4 digiflex right   -AH         Exercise 5    Exercise Name 5 wrist rollsx3  -AH         Exercise 6    Exercise Name 6 UE bike 3 min x2  -AH        User Key  (r) = Recorded By, (t) = Taken  By, (c) = Cosigned By    Initials Name Provider Type     Ericka Whitaker OT Occupational Therapist                          Time Calculation:   OT Start Time: 1000  OT Stop Time: 1105  OT Time Calculation (min): 65 min     Therapy Suggested Charges     Code   Minutes Charges    None             Therapy Charges for Today     Code Description Service Date Service Provider Modifiers Qty    43028805533 HC OT THERAPEUTIC ACT EA 15 MIN 7/26/2018 Ericka Whitaker, OT GO 3    [id number] HC OT ULTRASOUND EA 15 MIN 7/26/2018 Ericka Whitaker OT GO 1                    Ericka Whitaker OT  7/26/2018

## 2018-07-27 ENCOUNTER — HOSPITAL ENCOUNTER (OUTPATIENT)
Dept: OCCUPATIONAL THERAPY | Facility: HOSPITAL | Age: 43
Setting detail: THERAPIES SERIES
Discharge: HOME | End: 2018-07-27

## 2018-07-27 DIAGNOSIS — M79.601 RIGHT ARM PAIN: Primary | ICD-10-CM

## 2018-07-27 PROCEDURE — 97035 APP MDLTY 1+ULTRASOUND EA 15: CPT | Performed by: OCCUPATIONAL THERAPIST

## 2018-07-27 PROCEDURE — 97530 THERAPEUTIC ACTIVITIES: CPT | Performed by: OCCUPATIONAL THERAPIST

## 2018-07-27 NOTE — PROGRESS NOTES
Outpatient Occupational Therapy Ortho Treatment Note   [place]     Patient Name: Carlota Apodaca  : 1975  MRN: [id number]  Today's Date: 2018        Visit Date: 2018    There is no problem list on file for this patient.       Past Medical History:   Diagnosis Date   • Acid reflux    • Depression    • Elevated cholesterol         Past Surgical History:   Procedure Laterality Date   •  SECTION     • OVARY SURGERY Right     Removed Cyst and Ovary         Visit Dx:    ICD-10-CM ICD-9-CM   1. Right arm pain M79.601 729.5                              OT Assessment/Plan     Row Name 18 1158 18 1533       OT Assessment    Assessment Comments patient continues to demonstrate increaseing strenght right  -AH patient tolerated theray well reports some increased stiffness right arm today  -AH       OT Plan    OT Plan Comments continue OT  -AH continue OT  -AH      User Key  (r) = Recorded By, (t) = Taken By, (c) = Cosigned By    Initials Name Provider Type    CHENCHO Whitaker OT Occupational Therapist                     Modalities     Row Name 18 1100 18 1500          Subjective Comments    Subjective Comments  -- patient states her right elbow has been stiffer past few days  -AH        Moist Heat    MH Applied Yes  -AH Yes  -AH     Location right elbow  -AH right elbow  -AH        Ultrasound 90383    Location right elbow forearm  -AH right elbow forearm  -AH     Duty Cycle 50  -AH 50  -AH     Frequency 3.0 MHz  -AH 3.0 MHz  -AH     Intensity - Wts/cm 1.2  -AH  --       User Key  (r) = Recorded By, (t) = Taken By, (c) = Cosigned By    Initials Name Provider Type    CHENCHO Whitaker OT Occupational Therapist                OT Exercises     Row Name 18 1100 18 1500          Exercise 1    Exercise Name 1 massage  -AH massage  -AH        Exercise 2    Exercise Name 2 AROM table top slides  -AH AROM table top slides 2 min x3  -AH        Exercise 3     Exercise Name 3 graded pins right  -AH graded pins x3  -AH        Exercise 4    Exercise Name 4 digiflex right hand  -AH digiflex right   -AH        Exercise 5    Exercise Name 5 wrist rolls x3  -AH wrist rollsx3  -AH        Exercise 6    Exercise Name 6 UE bike 3 min x2  -AH UE bike 3 min x2  -AH       User Key  (r) = Recorded By, (t) = Taken By, (c) = Cosigned By    Initials Name Provider Type     Ericka Whitaker OT Occupational Therapist                          Time Calculation:   OT Start Time: 1000  OT Stop Time: 1100  OT Time Calculation (min): 60 min     Therapy Suggested Charges     Code   Minutes Charges    None             Therapy Charges for Today     Code Description Service Date Service Provider Modifiers Qty    [id number]  OT THERAPEUTIC ACT EA 15 MIN 7/27/2018 Ericka Whitaker OT GO 2    [id number]  OT ULTRASOUND EA 15 MIN 7/27/2018 Ericka Whitaker OT GO 1                    Ericka Whitaker OT  7/27/2018

## 2018-07-30 ENCOUNTER — HOSPITAL ENCOUNTER (OUTPATIENT)
Dept: OCCUPATIONAL THERAPY | Facility: HOSPITAL | Age: 43
Setting detail: THERAPIES SERIES
Discharge: HOME | End: 2018-07-30

## 2018-07-30 DIAGNOSIS — M79.601 RIGHT ARM PAIN: Primary | ICD-10-CM

## 2018-07-30 PROCEDURE — 97035 APP MDLTY 1+ULTRASOUND EA 15: CPT | Performed by: OCCUPATIONAL THERAPIST

## 2018-07-30 PROCEDURE — 97530 THERAPEUTIC ACTIVITIES: CPT | Performed by: OCCUPATIONAL THERAPIST

## 2018-07-30 NOTE — PROGRESS NOTES
Outpatient Occupational Therapy Ortho Treatment Note   [place]     Patient Name: Carlota Apodaca  : 1975  MRN: [id number]  Today's Date: 2018        Visit Date: 2018    There is no problem list on file for this patient.       Past Medical History:   Diagnosis Date   • Acid reflux    • Depression    • Elevated cholesterol         Past Surgical History:   Procedure Laterality Date   •  SECTION     • OVARY SURGERY Right     Removed Cyst and Ovary         Visit Dx:    ICD-10-CM ICD-9-CM   1. Right arm pain M79.601 729.5                              OT Assessment/Plan     Row Name 18 1140          OT Assessment    Assessment Comments patient demonstrating increasing right function   -AH        OT Plan    OT Plan Comments continue OT  -AH       User Key  (r) = Recorded By, (t) = Taken By, (c) = Cosigned By    Initials Name Provider Type     Ericka Whitaker OT Occupational Therapist                     Modalities     Row Name 18 1100             Subjective Comments    Subjective Comments patient reports right arm feeling better  -AH         Moist Heat    MH Applied Yes  -AH      Location right elbow  -AH         Ultrasound 00816    Location right elbow   -AH      Duty Cycle 50  -AH      Frequency 3.0 MHz  -AH      Intensity - Wts/cm 1.2  -AH        User Key  (r) = Recorded By, (t) = Taken By, (c) = Cosigned By    Initials Name Provider Type    CHNECHO Whitaker OT Occupational Therapist                OT Exercises     Row Name 18 1100             Exercise 1    Exercise Name 1 massage  -AH         Exercise 2    Exercise Name 2 AROM table top elbow slides  -AH         Exercise 3    Exercise Name 3 graded pins   -AH         Exercise 4    Exercise Name 4 wrist rolls  -AH         Exercise 5    Exercise Name 5 digiflex   -AH         Exercise 6    Exercise Name 6 UE bike 3 min x2  -AH        User Key  (r) = Recorded By, (t) = Taken By, (c) = Cosigned By    Initials Name  Provider Type     Ericka Whitaker OT Occupational Therapist                          Time Calculation:   OT Start Time: 0800  OT Stop Time: 0900  OT Time Calculation (min): 60 min     Therapy Suggested Charges     Code   Minutes Charges    None             Therapy Charges for Today     Code Description Service Date Service Provider Modifiers Qty    07272787110  OT THERAPEUTIC ACT EA 15 MIN 7/30/2018 Ericka Whitaker OT GO 2    [id number]  OT ULTRASOUND EA 15 MIN 7/30/2018 Ericka Whitaker OT GO 1                    Ericka Whitaker OT  7/30/2018

## 2018-07-31 ENCOUNTER — HOSPITAL ENCOUNTER (OUTPATIENT)
Dept: OCCUPATIONAL THERAPY | Facility: HOSPITAL | Age: 43
Setting detail: THERAPIES SERIES
Discharge: HOME | End: 2018-07-31

## 2018-07-31 DIAGNOSIS — M79.601 RIGHT ARM PAIN: Primary | ICD-10-CM

## 2018-07-31 PROCEDURE — 97530 THERAPEUTIC ACTIVITIES: CPT | Performed by: OCCUPATIONAL THERAPIST

## 2018-07-31 PROCEDURE — 97035 APP MDLTY 1+ULTRASOUND EA 15: CPT | Performed by: OCCUPATIONAL THERAPIST

## 2018-11-06 ENCOUNTER — OFFICE VISIT (OUTPATIENT)
Dept: PULMONOLOGY | Facility: CLINIC | Age: 43
End: 2018-11-06

## 2018-11-06 VITALS
WEIGHT: 162 LBS | HEART RATE: 96 BPM | BODY MASS INDEX: 30.58 KG/M2 | TEMPERATURE: 98.1 F | HEIGHT: 61 IN | DIASTOLIC BLOOD PRESSURE: 77 MMHG | OXYGEN SATURATION: 97 % | SYSTOLIC BLOOD PRESSURE: 111 MMHG

## 2018-11-06 DIAGNOSIS — G47.33 OSA (OBSTRUCTIVE SLEEP APNEA): Primary | ICD-10-CM

## 2018-11-06 DIAGNOSIS — R06.02 SHORTNESS OF BREATH: ICD-10-CM

## 2018-11-06 PROCEDURE — 99243 OFF/OP CNSLTJ NEW/EST LOW 30: CPT | Performed by: NURSE PRACTITIONER

## 2018-11-06 RX ORDER — LANSOPRAZOLE 30 MG/1
30 CAPSULE, DELAYED RELEASE ORAL DAILY
COMMUNITY
Start: 2018-10-10

## 2018-11-06 RX ORDER — PAROXETINE HYDROCHLORIDE 20 MG/1
TABLET, FILM COATED ORAL
COMMUNITY
Start: 2018-10-21 | End: 2019-03-08

## 2018-11-06 RX ORDER — IBUPROFEN 600 MG/1
TABLET ORAL
COMMUNITY
Start: 2018-09-10 | End: 2019-01-07 | Stop reason: SINTOL

## 2018-11-06 RX ORDER — LANSOPRAZOLE 15 MG/1
15 CAPSULE, DELAYED RELEASE ORAL DAILY
COMMUNITY
End: 2019-03-08

## 2018-11-06 NOTE — PROGRESS NOTES
Were you born premature?  no    Any Childhood infections? no      Breathing problems when you were a child? no    Any childhood allergies?    no             At what age did you begin smoking? Never a smoker    Smoking marijuana? no    Any IV drugs? no    How many packs per day? 0    Lung Function Test? no  Chest X-Ray? yes    CT Chest? no Allergy Test? no    Family hx of Lung disease or Lung Cancer?yes    If FHx is posivitive for lung cancer, what is the relationship of the family member? maternal aunt    Any hospitalization in the last year? no    How far can you walk without getting short of breath? 1 mile    Any coughing? yes    Any wheezing? no    Acid Reflux? yes    Do you snore? yes    Daytime Fatigue? yes    Any pets? yes   Any pet allergies? no    Occupation? [employer]    Have you been exposed to any chemicals at your job? no    What inhalers are you currently using? None    Have you had the Influenza Vaccine? no   Would you like to receive this Vaccine today? no    Have you had the Pneumonia Vaccine?  no  Would you like to receive this Vaccine today? no      Subjective    Carlota Apodaca presents for the following Sleep Apnea      History of Present Illness     Ms. Apodaca is here today to have an evaulation for possible sleep apnea. A consult for sleep apnea was placed by her PCP Kitty Jennings PA-C.  She states that she feels extreme fatigue during the day and can fall asleep easily throughout the day. She states that she does not sleep well throughout the night and has multiple nighttime awakenings.  She states that she sometimes wakes up coughing.  She states that she has been told that she snores but no periods of apnea were reported.  She is treated for GERD.  She is a non smoker and has no major shortness of breath on a regular basis.  She has no personal history of lung disease or illnesses as an adult or child.    Review of Systems   Constitutional: Positive for fatigue. Negative for activity change,  appetite change, chills and unexpected weight change.   HENT: Negative for congestion, postnasal drip and rhinorrhea.    Respiratory: Positive for cough and shortness of breath. Negative for apnea, chest tightness and wheezing.    Cardiovascular: Negative for chest pain, palpitations and leg swelling.   Gastrointestinal: Negative for nausea.   Musculoskeletal: Negative for gait problem.   Skin: Negative for pallor.   Allergic/Immunologic: Negative for environmental allergies.   Neurological: Negative for syncope.   Psychiatric/Behavioral: Negative for confusion. The patient is not nervous/anxious.        Active Problems:  Problem List Items Addressed This Visit        Respiratory    DARREN (obstructive sleep apnea) - Primary    Relevant Orders    TSH    T4, Free    Home Sleep Study    Adult Transthoracic Echo Complete W/ Cont if Necessary Per Protocol    Shortness of breath    Relevant Orders    Adult Transthoracic Echo Complete W/ Cont if Necessary Per Protocol          Past Medical History:  Past Medical History:   Diagnosis Date   • Acid reflux    • Depression    • Elevated cholesterol        Family History:  Family History   Problem Relation Age of Onset   • No Known Problems Mother    • No Known Problems Father        Social History:  Social History   Substance Use Topics   • Smoking status: Never Smoker   • Smokeless tobacco: Not on file   • Alcohol use No       Current Medications:  Current Outpatient Prescriptions   Medication Sig Dispense Refill   • citalopram (CeleXA) 20 MG tablet Take 20 mg by mouth Daily.     • ibuprofen (ADVIL,MOTRIN) 600 MG tablet      • lansoprazole (PREVACID) 30 MG capsule      • loratadine (CLARITIN) 10 MG tablet Take 10 mg by mouth Daily.     • omeprazole (prilOSEC) 10 MG capsule Take 10 mg by mouth Daily.     • PARoxetine (PAXIL) 20 MG tablet      • lansoprazole (PREVACID) 15 MG capsule Take 15 mg by mouth Daily.     • ondansetron (ZOFRAN) 4 MG tablet Take 1 tablet by mouth Every 8  "(Eight) Hours As Needed for Nausea or Vomiting. 12 tablet 0     No current facility-administered medications for this visit.        Allergies:  Allergies   Allergen Reactions   • Hydrocodone Other (See Comments)     Exact reaction unknown       Vitals:  /77   Pulse 96   Temp 98.1 °F (36.7 °C) (Oral)   Ht 154.9 cm (61\")   Wt 73.5 kg (162 lb)   SpO2 97%   BMI 30.61 kg/m²     Imaging:    Imaging Results (most recent)     None          Pulmonary Functions Testing Results:    No results found for: FEV1, FVC, PFO2QIL, TLC, DLCO    Results for orders placed or performed in visit on 03/28/17   POC Influenza A / B   Result Value Ref Range    Rapid Influenza A Ag neg     Rapid Influenza B Ag neg     Internal Control Passed Passed    Lot Number [id number]     Expiration Date 12/2,018        Objective   Physical Exam     GENERAL APPEARANCE: Well developed, well nourished, alert and cooperative, and appears to be in no acute distress.    HEAD: normocephalic.    EYES: PERRL, EOMI. Fundi normal, vision is grossly intact.    THROAT: Oral cavity and pharynx normal. No inflammation, swelling, exudate, or lesions.     NECK: Neck supple.     CARDIAC: Normal S1 and S2. No S3, S4 or murmurs. Rhythm is regular. There is no peripheral edema, cyanosis or pallor. Extremities are warm and well perfused. Capillary refill is less than 2 seconds. No carotid bruits.    RESPIRATORY: Clear to auscultation without rales, rhonchi, wheezing or diminished breath sounds.    GI: Positive bowel sounds. Soft, nondistended, nontender.     MUSCULOSKELETAL: No significant deformity or joint abnormality. No edema. Peripheral pulses intact. No varicosities.    NEUROLOGICAL: Strength and sensation symmetric and intact throughout.     PSYCHIATRIC: The mental examination revealed the patient was oriented to person, place, and time.       Assessment/Plan      Obstructive sleep apnea  Casselton's sleepiness score: 11  STOP-BANG score: 5  Obesity: " present  Plan:  - ordered home Sleep study.  - Will treat with autopap according to sleep study results.   - Educated her to avoid sedatives and heavy alcohol use  - Patient's Body mass index is 30.61 kg/m². BMI is above normal parameters. Recommendations include: exercise counseling and nutrition counseling.  - She is a non smoker.  - Ordered TSH and free T4 to rule out hypothyroidism.  - Ordered echo to rule out pulmonary hypertension and CHF.  -Educated patient on the complications associated with untreated sleep apnea -- that it increases risk of MI, stroke, depression, hypertension, heart failure, arrhythmias, coronary artery disease, and potential death due to complication of that mentioned previously.    Also patient was educated  about the hazards of driving if they are sleep deprived and have sleep apnea.  Was instructed not to involving driving or any activity which involves higher level of mental function- like operating a machine-  if they are sleep deprived and not wearing  their CPAP and sleep apnea is not treated.                  ICD-10-CM ICD-9-CM   1. DARREN (obstructive sleep apnea) G47.33 327.23   2. Shortness of breath R06.02 786.05       Return in about 3 months (around 2/6/2019).

## 2018-11-07 ENCOUNTER — LAB (OUTPATIENT)
Dept: LAB | Facility: HOSPITAL | Age: 43
End: 2018-11-07

## 2018-11-07 DIAGNOSIS — G47.33 OSA (OBSTRUCTIVE SLEEP APNEA): ICD-10-CM

## 2018-11-07 LAB
T4 FREE SERPL-MCNC: 0.91 NG/DL (ref 0.89–1.76)
TSH SERPL DL<=0.05 MIU/L-ACNC: 2.6 MIU/ML (ref 0.55–4.78)

## 2018-11-07 PROCEDURE — 36415 COLL VENOUS BLD VENIPUNCTURE: CPT

## 2018-11-07 PROCEDURE — 84443 ASSAY THYROID STIM HORMONE: CPT

## 2018-11-07 PROCEDURE — 84439 ASSAY OF FREE THYROXINE: CPT

## 2018-11-13 ENCOUNTER — HOSPITAL ENCOUNTER (OUTPATIENT)
Dept: CARDIOLOGY | Facility: HOSPITAL | Age: 43
Discharge: HOME | End: 2018-11-13
Admitting: NURSE PRACTITIONER

## 2018-11-13 DIAGNOSIS — G47.33 OSA (OBSTRUCTIVE SLEEP APNEA): ICD-10-CM

## 2018-11-13 DIAGNOSIS — R06.02 SHORTNESS OF BREATH: ICD-10-CM

## 2018-11-13 PROCEDURE — 93306 TTE W/DOPPLER COMPLETE: CPT

## 2018-11-13 PROCEDURE — 93306 TTE W/DOPPLER COMPLETE: CPT | Performed by: INTERNAL MEDICINE

## 2018-11-14 LAB
BH CV ECHO MEAS - % IVS THICK: 43.2 %
BH CV ECHO MEAS - % LVPW THICK: 122.2 %
BH CV ECHO MEAS - ACS: 1.7 CM
BH CV ECHO MEAS - AO MAX PG: 5.7 MMHG
BH CV ECHO MEAS - AO MEAN PG: 3.5 MMHG
BH CV ECHO MEAS - AO ROOT AREA (BSA CORRECTED): 1.5
BH CV ECHO MEAS - AO ROOT AREA: 5.4 CM^2
BH CV ECHO MEAS - AO ROOT DIAM: 2.6 CM
BH CV ECHO MEAS - AO V2 MAX: 119.4 CM/SEC
BH CV ECHO MEAS - AO V2 MEAN: 89 CM/SEC
BH CV ECHO MEAS - AO V2 VTI: 21.6 CM
BH CV ECHO MEAS - BSA(HAYCOCK): 1.8 M^2
BH CV ECHO MEAS - BSA: 1.7 M^2
BH CV ECHO MEAS - BZI_BMI: 30.6 KILOGRAMS/M^2
BH CV ECHO MEAS - BZI_METRIC_HEIGHT: 154.9 CM
BH CV ECHO MEAS - BZI_METRIC_WEIGHT: 73.5 KG
BH CV ECHO MEAS - EDV(CUBED): 112.2 ML
BH CV ECHO MEAS - EDV(MOD-SP4): 71 ML
BH CV ECHO MEAS - EDV(TEICH): 108.7 ML
BH CV ECHO MEAS - EF(CUBED): 60.9 %
BH CV ECHO MEAS - EF(MOD-SP4): 50.7 %
BH CV ECHO MEAS - EF(TEICH): 52.4 %
BH CV ECHO MEAS - ESV(CUBED): 43.8 ML
BH CV ECHO MEAS - ESV(MOD-SP4): 35 ML
BH CV ECHO MEAS - ESV(TEICH): 51.8 ML
BH CV ECHO MEAS - FS: 26.9 %
BH CV ECHO MEAS - IVS/LVPW: 1.6
BH CV ECHO MEAS - IVSD: 0.92 CM
BH CV ECHO MEAS - IVSS: 1.3 CM
BH CV ECHO MEAS - LA DIMENSION: 3.1 CM
BH CV ECHO MEAS - LA/AO: 1.2
BH CV ECHO MEAS - LV DIASTOLIC VOL/BSA (35-75): 41.1 ML/M^2
BH CV ECHO MEAS - LV MASS(C)D: 115.8 GRAMS
BH CV ECHO MEAS - LV MASS(C)DI: 67.1 GRAMS/M^2
BH CV ECHO MEAS - LV MASS(C)S: 152.3 GRAMS
BH CV ECHO MEAS - LV MASS(C)SI: 88.2 GRAMS/M^2
BH CV ECHO MEAS - LV SYSTOLIC VOL/BSA (12-30): 20.3 ML/M^2
BH CV ECHO MEAS - LVIDD: 4.8 CM
BH CV ECHO MEAS - LVIDS: 3.5 CM
BH CV ECHO MEAS - LVLD AP4: 7.7 CM
BH CV ECHO MEAS - LVLS AP4: 6.4 CM
BH CV ECHO MEAS - LVOT AREA (M): 2.5 CM^2
BH CV ECHO MEAS - LVOT AREA: 2.5 CM^2
BH CV ECHO MEAS - LVOT DIAM: 1.8 CM
BH CV ECHO MEAS - LVPWD: 0.56 CM
BH CV ECHO MEAS - LVPWS: 1.2 CM
BH CV ECHO MEAS - MV A MAX VEL: 47.4 CM/SEC
BH CV ECHO MEAS - MV E MAX VEL: 55.8 CM/SEC
BH CV ECHO MEAS - MV E/A: 1.2
BH CV ECHO MEAS - PA ACC SLOPE: 1280 CM/SEC^2
BH CV ECHO MEAS - PA ACC TIME: 0.09 SEC
BH CV ECHO MEAS - PA PR(ACCEL): 39.4 MMHG
BH CV ECHO MEAS - RAP SYSTOLE: 10 MMHG
BH CV ECHO MEAS - RVDD: 2.7 CM
BH CV ECHO MEAS - RVSP: 24.2 MMHG
BH CV ECHO MEAS - SI(AO): 67.7 ML/M^2
BH CV ECHO MEAS - SI(CUBED): 39.6 ML/M^2
BH CV ECHO MEAS - SI(MOD-SP4): 20.8 ML/M^2
BH CV ECHO MEAS - SI(TEICH): 33 ML/M^2
BH CV ECHO MEAS - SV(AO): 116.9 ML
BH CV ECHO MEAS - SV(CUBED): 68.3 ML
BH CV ECHO MEAS - SV(MOD-SP4): 36 ML
BH CV ECHO MEAS - SV(TEICH): 56.9 ML
BH CV ECHO MEAS - TR MAX VEL: 188.7 CM/SEC
MAXIMAL PREDICTED HEART RATE: 179 BPM
STRESS TARGET HR: 152 BPM

## 2018-11-19 ENCOUNTER — DOCUMENTATION (OUTPATIENT)
Dept: PULMONOLOGY | Facility: CLINIC | Age: 43
End: 2018-11-19

## 2018-11-19 DIAGNOSIS — I34.0 MITRAL VALVE INSUFFICIENCY, UNSPECIFIED ETIOLOGY: Primary | ICD-10-CM

## 2018-11-19 NOTE — PROGRESS NOTES
EF is noted to be 51-55% and she is noted to have mild mitral regurgitation. Will send to cardiology for further evaluation.  This was discussed with the patient.

## 2019-01-07 ENCOUNTER — OFFICE VISIT (OUTPATIENT)
Dept: CARDIOLOGY | Facility: CLINIC | Age: 44
End: 2019-01-07

## 2019-01-07 ENCOUNTER — CLINICAL SUPPORT (OUTPATIENT)
Dept: CARDIOLOGY | Facility: CLINIC | Age: 44
End: 2019-01-07

## 2019-01-07 VITALS
SYSTOLIC BLOOD PRESSURE: 121 MMHG | HEIGHT: 61 IN | BODY MASS INDEX: 31.79 KG/M2 | WEIGHT: 168.4 LBS | RESPIRATION RATE: 18 BRPM | DIASTOLIC BLOOD PRESSURE: 72 MMHG | OXYGEN SATURATION: 98 % | HEART RATE: 97 BPM

## 2019-01-07 DIAGNOSIS — R55 SYNCOPE, UNSPECIFIED SYNCOPE TYPE: ICD-10-CM

## 2019-01-07 DIAGNOSIS — R07.2 PRECORDIAL PAIN: Primary | ICD-10-CM

## 2019-01-07 DIAGNOSIS — Z82.49 FAMILY HISTORY OF PREMATURE CORONARY ARTERY DISEASE: ICD-10-CM

## 2019-01-07 DIAGNOSIS — R00.2 PALPITATIONS: ICD-10-CM

## 2019-01-07 PROCEDURE — 99204 OFFICE O/P NEW MOD 45 MIN: CPT | Performed by: INTERNAL MEDICINE

## 2019-01-07 PROCEDURE — 93270 REMOTE 30 DAY ECG REV/REPORT: CPT | Performed by: INTERNAL MEDICINE

## 2019-01-07 PROCEDURE — 93000 ELECTROCARDIOGRAM COMPLETE: CPT | Performed by: INTERNAL MEDICINE

## 2019-01-07 NOTE — PROGRESS NOTES
Kitty Jennings PA  Carlota Apodaca  1975 01/07/2019    Patient Active Problem List   Diagnosis   • DARREN (obstructive sleep apnea)   • Shortness of breath   • Precordial pain   • Syncope   • Family history of premature coronary artery disease   • Palpitations       Dear Kitty Jennings PA:    Subjective     Carlota Apodaca is a 43 y.o. female with the problems as listed above, presents    Chief complaint: Recurrent chest pains, dizziness and an episode of syncope.    History of Present Illness: Ms. Apodaca is a pleasant 41-year-old  female with history of known heart disease or coronary artery disease, has a positive family history of premature coronary artery disease in both her grandparents, presents with complains of having recurrent episodes of chest pains on and off for a long time.  She describes these as being felt as pressure and tightness sometimes in the right upper part of her chest and sometimes in the substernal region with associated shortness of breath.  These episodes seem to occur with no relation to exertion and usually lasts for about 1-2 minutes and are relieved spontaneously.  They are of moderate intensity.  She has dyspnea with moderate severe exertion with no PND, orthopnea or pedal edema.  She has been having episodes of dizziness and had an episode of syncope at around [place].  This episode happened while she got dizzy and went to the bathroom and apparently passed out and when she woke up she found that she has vomited.  She apparently lost control of her bowels.  She apparently has not seen her PCP since.  She has intermittent palpitations with rapid heart beat but no previously documented cardiac arrhythmias.  She does indicate that she has family history of seizures in one of her aunts.    Cardiac risk factors:Positive family Hx. of premature athersclerotivc disease., Obesity and Age and postmenopausal status.    Allergies   Allergen Reactions   • Hydrocodone Other (See  Comments)     Exact reaction unknown   :      Current Outpatient Medications:   •  lansoprazole (PREVACID) 30 MG capsule, , Disp: , Rfl:   •  loratadine (CLARITIN) 10 MG tablet, Take 10 mg by mouth Daily., Disp: , Rfl:   •  PARoxetine (PAXIL) 20 MG tablet, , Disp: , Rfl:   •  aspirin 81 MG tablet, Take 1 tablet by mouth Daily., Disp: 30 tablet, Rfl: 1  •  citalopram (CeleXA) 20 MG tablet, Take 20 mg by mouth Daily., Disp: , Rfl:   •  lansoprazole (PREVACID) 15 MG capsule, Take 15 mg by mouth Daily., Disp: , Rfl:   •  omeprazole (prilOSEC) 10 MG capsule, Take 10 mg by mouth Daily., Disp: , Rfl:   •  ondansetron (ZOFRAN) 4 MG tablet, Take 1 tablet by mouth Every 8 (Eight) Hours As Needed for Nausea or Vomiting., Disp: 12 tablet, Rfl: 0    Past Medical History:   Diagnosis Date   • Acid reflux    • Depression    • Elevated cholesterol      Past Surgical History:   Procedure Laterality Date   •  SECTION     • OVARY SURGERY Right     Removed Cyst and Ovary     Family History   Problem Relation Age of Onset   • No Known Problems Mother    • No Known Problems Father    • Heart disease Maternal Grandfather    • Heart disease Paternal Grandfather      Social History     Tobacco Use   • Smoking status: Never Smoker   • Smokeless tobacco: Never Used   Substance Use Topics   • Alcohol use: No   • Drug use: No       Review of Systems   Constitution: Positive for weakness, malaise/fatigue and night sweats. Negative for chills, diaphoresis and fever.   Eyes: Positive for blurred vision.   Cardiovascular: Positive for chest pain, dyspnea on exertion, palpitations and syncope. Negative for claudication, cyanosis, irregular heartbeat, leg swelling, near-syncope, orthopnea and paroxysmal nocturnal dyspnea.   Respiratory: Negative for cough, hemoptysis and shortness of breath.    Endocrine: Negative for cold intolerance and heat intolerance.   Hematologic/Lymphatic: Does not bruise/bleed easily.   Skin: Negative for rash.  "  Musculoskeletal: Negative for myalgias.   Gastrointestinal: Negative for abdominal pain, constipation, diarrhea, nausea and vomiting.   Genitourinary: Negative for dysuria and hematuria.   Neurological: Positive for dizziness, light-headedness and seizures. Negative for focal weakness, headaches, loss of balance and numbness.   Psychiatric/Behavioral: Negative for substance abuse. The patient is nervous/anxious.        Objective   Blood pressure 121/72, pulse 97, resp. rate 18, height 154.9 cm (61\"), weight 76.4 kg (168 lb 6.4 oz), SpO2 98 %.  Body mass index is 31.82 kg/m².        Physical Exam   Constitutional: She is oriented to person, place, and time. She appears well-developed and well-nourished.   HENT:   Mouth/Throat: Oropharynx is clear and moist.   Eyes: EOM are normal. Pupils are equal, round, and reactive to light.   Neck: Neck supple. No JVD present. No tracheal deviation present. No thyromegaly present.   Cardiovascular: Normal rate, regular rhythm, S1 normal and S2 normal. Exam reveals no gallop, no S3, no S4 and no friction rub.   No murmur heard.  Pulses:       Dorsalis pedis pulses are 2+ on the right side, and 2+ on the left side.        Posterior tibial pulses are 2+ on the right side, and 2+ on the left side.   Pulmonary/Chest: Effort normal and breath sounds normal.   Abdominal: Soft. Bowel sounds are normal. She exhibits no mass. There is no tenderness.   Musculoskeletal: Normal range of motion. She exhibits no edema.   Lymphadenopathy:     She has no cervical adenopathy.   Neurological: She is alert and oriented to person, place, and time.   Skin: Skin is warm and dry. No rash noted.   Psychiatric: She has a normal mood and affect.         Lab Results   Component Value Date    TSH 2.598 11/07/2018    Carlota IRVIN Constanza   Echocardiogram   Order# [id number]   Reading physician: Chad Dominguez MD Ordering physician: Sophie Harper APRN Study date: 11/13/18   Patient Information     Patient " Name  Carlota Apodaca MRN  [id number] Sex  Female  (Age)  1975 (43 y.o.)   Sedation Narrator Report     Interpretation Summary     · Normal left ventricular cavity size and wall thickness noted. All left ventricular wall segments contract normally.  · Estimated EF appears to be in the range of 51 - 55%.  · The aortic valve is structurally normal. No aortic valve regurgitation is present. No aortic valve stenosis is present.  · The mitral valve is normal in structure. Mild mitral valve regurgitation is present. No significant mitral valve stenosis is present.  · The tricuspid valve is normal. No tricuspid valve stenosis is present. No tricuspid valve regurgitation is present.  · There is no evidence of pericardial effusion.            ECG 12 Lead  Date/Time: 2019 8:12 AM  Performed by: Chad Dominguez MD  Authorized by: Chad Dominguez MD   Previous ECG: no previous ECG available  Rhythm: sinus rhythm  BPM: 95  Conduction: conduction normal  ST Segments: ST segments normal  T Waves: T waves normal  Other: no other findings  Clinical impression: normal ECG              Assessment/Plan    Diagnosis Plan   1. Precordial pain  Adult Stress Echo W/ Cont or Stress Agent if Necessary Per Protocol   2. Syncope, unspecified syncope type  Cardiac Event Monitor    Ambulatory Referral to Neurology   3. Palpitations     4. Family history of premature coronary artery disease       Recommendations:    Orders Placed This Encounter   Procedures   • Ambulatory Referral to Neurology   • Cardiac Event Monitor   • ECG 12 Lead        1. Start enteric-coated aspirin 81 mg daily.  2. Evaluate her chest pains further with a stress echo  3. Evaluate her syncope with an event monitor to rule out an infrequent cardiac arrhythmias.  4. I would like to refer her to a neurologist to rule out any seizure problems since  this episode is somewhat suspicious for a seizure episode.    Return in about 4 weeks (around 2019).    As always,  Kitty Saha,I appreciate very much the opportunity to participate in the cardiovascular care of your patients. Please do not hesitate to call me with any questions with regards to Carlota Apodaca's evaluation and management.       With Best Regards,        Chad Dominguez MD, [facility]    Dragon disclaimer:  Much of this encounter note is an electronic transcription/translation of spoken language to printed text. The electronic translation of spoken language may permit erroneous, or at times, nonsensical words or phrases to be inadvertently transcribed; Although I have reviewed the note for such errors, some may still exist.

## 2019-01-07 NOTE — PROGRESS NOTES
[facility] order by Dr. Dominguez, dx: syncope.  Monitor s/n [id number], kit #[id number] was given to pt and placed on pt by BRITNI Ledesma.  Pt educated on it's use.  All questions answered to her satisfaction. Enrollment and order scanned to this encounter.

## 2019-01-07 NOTE — PATIENT INSTRUCTIONS
Cardiac Event Monitoring  A cardiac event monitor is a small recording device that is used to detect abnormal heart rhythms (arrhythmias). The monitor is used to record your heart rhythm when you have symptoms, such as:  · Fast heartbeats (palpitations), such as heart racing or fluttering.  · Dizziness.  · Fainting or light-headedness.  · Unexplained weakness.    Some monitors are wired to electrodes placed on your chest. Electrodes are flat, sticky disks that attach to your skin. Other monitors may be hand-held or worn on the wrist. The monitor can be worn for up to 30 days.  If the monitor is attached to your chest, a technician will prepare your chest for the electrode placement and show you how to work the monitor. Take time to practice using the monitor before you leave the office. Make sure you understand how to send the information from the monitor to your health care provider. In some cases, you may need to use a landline telephone instead of a cell phone.  What are the risks?  Generally, this device is safe to use, but it possible that the skin under the electrodes will become irritated.  How to use your cardiac event monitor  · Wear your monitor at all times, except when you are in water:  ? Do not let the monitor get wet.  ? Take the monitor off when you bathe. Do not swim or use a hot tub with it on.  · Keep your skin clean. Do not put body lotion or moisturizer on your chest.  · Change the electrodes as told by your health care provider or any time they stop sticking to your skin. You may need to use medical tape to keep them on.  · Try to put the electrodes in slightly different places on your chest to help prevent skin irritation. They must remain in the area under your left breast and in the upper right section of your chest.  · Make sure the monitor is safely clipped to your clothing or in a location close to your body that your health care provider recommends.  · Press the button to record as soon  as you feel heart-related symptoms, such as:  ? Dizziness.  ? Weakness.  ? Light-headedness.  ? Palpitations.  ? Thumping or pounding in your chest.  ? Shortness of breath.  ? Unexplained weakness.  · Keep a diary of your activities, such as walking, doing chores, and taking medicine. It is very important to note what you were doing when you pushed the button to record your symptoms. This will help your health care provider determine what might be contributing to your symptoms.  · Send the recorded information as recommended by your health care provider. It may take some time for your health care provider to process the results.  · Change the batteries as told by your health care provider.  · Keep electronic devices away from your monitor. This includes:  ? Tablets.  ? [employer] players.  ? Cell phones.  · While wearing your monitor you should avoid:  ? Electric blankets.  ? Electric razors.  ? Electric toothbrushes.  ? Microwave ovens.  ? Magnets.  ? Metal detectors.  Get help right away if:  · You have chest pain.  · You have extreme difficulty breathing or shortness of breath.  · You develop a very fast heartbeat that persists.  · You develop dizziness that does not go away.  · You faint or constantly feel like you are about to faint.  Summary  · A cardiac event monitor is a small recording device that is used to help detect abnormal heart rhythms (arrhythmias).  · The monitor is used to record your heart rhythm when you have heart-related symptoms.  · Make sure you understand how to send the information from the monitor to your health care provider.  · It is important to press the button on the monitor when you have any heart-related symptoms.  · Keep a diary of your activities, such as walking, doing chores, and taking medicine. It is very important to note what you were doing when you pushed the button to record your symptoms. This will help your health care provider learn what might be causing your symptoms.  This  information is not intended to replace advice given to you by your health care provider. Make sure you discuss any questions you have with your health care provider.  Document Released: 09/26/2009 Document Revised: 12/02/2017 Document Reviewed: 12/02/2017  [employer] Interactive Patient Education © 2017 [employer].

## 2019-01-07 NOTE — PATIENT INSTRUCTIONS
Cardiac Event Monitoring  A cardiac event monitor is a small recording device that is used to detect abnormal heart rhythms (arrhythmias). The monitor is used to record your heart rhythm when you have symptoms, such as:  · Fast heartbeats (palpitations), such as heart racing or fluttering.  · Dizziness.  · Fainting or light-headedness.  · Unexplained weakness.    Some monitors are wired to electrodes placed on your chest. Electrodes are flat, sticky disks that attach to your skin. Other monitors may be hand-held or worn on the wrist. The monitor can be worn for up to 30 days.  If the monitor is attached to your chest, a technician will prepare your chest for the electrode placement and show you how to work the monitor. Take time to practice using the monitor before you leave the office. Make sure you understand how to send the information from the monitor to your health care provider. In some cases, you may need to use a landline telephone instead of a cell phone.  What are the risks?  Generally, this device is safe to use, but it possible that the skin under the electrodes will become irritated.  How to use your cardiac event monitor  · Wear your monitor at all times, except when you are in water:  ? Do not let the monitor get wet.  ? Take the monitor off when you bathe. Do not swim or use a hot tub with it on.  · Keep your skin clean. Do not put body lotion or moisturizer on your chest.  · Change the electrodes as told by your health care provider or any time they stop sticking to your skin. You may need to use medical tape to keep them on.  · Try to put the electrodes in slightly different places on your chest to help prevent skin irritation. They must remain in the area under your left breast and in the upper right section of your chest.  · Make sure the monitor is safely clipped to your clothing or in a location close to your body that your health care provider recommends.  · Press the button to record as soon  as you feel heart-related symptoms, such as:  ? Dizziness.  ? Weakness.  ? Light-headedness.  ? Palpitations.  ? Thumping or pounding in your chest.  ? Shortness of breath.  ? Unexplained weakness.  · Keep a diary of your activities, such as walking, doing chores, and taking medicine. It is very important to note what you were doing when you pushed the button to record your symptoms. This will help your health care provider determine what might be contributing to your symptoms.  · Send the recorded information as recommended by your health care provider. It may take some time for your health care provider to process the results.  · Change the batteries as told by your health care provider.  · Keep electronic devices away from your monitor. This includes:  ? Tablets.  ? [employer] players.  ? Cell phones.  · While wearing your monitor you should avoid:  ? Electric blankets.  ? Electric razors.  ? Electric toothbrushes.  ? Microwave ovens.  ? Magnets.  ? Metal detectors.  Get help right away if:  · You have chest pain.  · You have extreme difficulty breathing or shortness of breath.  · You develop a very fast heartbeat that persists.  · You develop dizziness that does not go away.  · You faint or constantly feel like you are about to faint.  Summary  · A cardiac event monitor is a small recording device that is used to help detect abnormal heart rhythms (arrhythmias).  · The monitor is used to record your heart rhythm when you have heart-related symptoms.  · Make sure you understand how to send the information from the monitor to your health care provider.  · It is important to press the button on the monitor when you have any heart-related symptoms.  · Keep a diary of your activities, such as walking, doing chores, and taking medicine. It is very important to note what you were doing when you pushed the button to record your symptoms. This will help your health care provider learn what might be causing your symptoms.  This  information is not intended to replace advice given to you by your health care provider. Make sure you discuss any questions you have with your health care provider.  Document Released: 09/26/2009 Document Revised: 12/02/2017 Document Reviewed: 12/02/2017  [employer] Interactive Patient Education © 2017 Elsevier Inc.

## 2019-01-14 ENCOUNTER — TELEPHONE (OUTPATIENT)
Dept: CARDIOLOGY | Facility: CLINIC | Age: 44
End: 2019-01-14

## 2019-01-14 NOTE — TELEPHONE ENCOUNTER
[facility] called to report an ABN reading. Carlota had severe tachycardia on the 13th at 11:31 am and on the 14th at 6:29 am. Highest HR was 146 on the 13th and 157 on the 14th.

## 2019-01-15 ENCOUNTER — HOSPITAL ENCOUNTER (OUTPATIENT)
Dept: CARDIOLOGY | Facility: HOSPITAL | Age: 44
Discharge: HOME | End: 2019-01-15
Attending: INTERNAL MEDICINE | Admitting: INTERNAL MEDICINE

## 2019-01-15 DIAGNOSIS — R07.2 PRECORDIAL PAIN: ICD-10-CM

## 2019-01-15 PROCEDURE — 93351 STRESS TTE COMPLETE: CPT

## 2019-01-15 PROCEDURE — 93018 CV STRESS TEST I&R ONLY: CPT | Performed by: INTERNAL MEDICINE

## 2019-01-15 PROCEDURE — 93350 STRESS TTE ONLY: CPT | Performed by: INTERNAL MEDICINE

## 2019-01-16 ENCOUNTER — APPOINTMENT (OUTPATIENT)
Dept: CARDIOLOGY | Facility: HOSPITAL | Age: 44
End: 2019-01-16
Attending: INTERNAL MEDICINE

## 2019-01-16 LAB
BH CV ECHO MEAS - BSA(HAYCOCK): 1.8 M^2
BH CV ECHO MEAS - BSA: 1.8 M^2
BH CV ECHO MEAS - BZI_BMI: 31.7 KILOGRAMS/M^2
BH CV ECHO MEAS - BZI_METRIC_HEIGHT: 154.9 CM
BH CV ECHO MEAS - BZI_METRIC_WEIGHT: 76.2 KG
BH CV STRESS BP STAGE 1: NORMAL
BH CV STRESS DURATION MIN STAGE 1: 3
BH CV STRESS DURATION SEC STAGE 1: 0
BH CV STRESS ECHO POST STRESS EJECTION FRACTION EF: 70 %
BH CV STRESS GRADE STAGE 1: 10
BH CV STRESS HR STAGE 1: 163
BH CV STRESS METS STAGE 1: 5
BH CV STRESS PROTOCOL 1: NORMAL
BH CV STRESS RECOVERY BP: NORMAL MMHG
BH CV STRESS RECOVERY HR: 91 BPM
BH CV STRESS SPEED STAGE 1: 1.7
BH CV STRESS STAGE 1: 1
MAXIMAL PREDICTED HEART RATE: 178 BPM
PERCENT MAX PREDICTED HR: 91.57 %
STRESS BASELINE BP: NORMAL MMHG
STRESS BASELINE HR: 119 BPM
STRESS PERCENT HR: 108 %
STRESS POST ESTIMATED WORKLOAD: 4.6 METS
STRESS POST EXERCISE DUR MIN: 3 MIN
STRESS POST EXERCISE DUR SEC: 0 SEC
STRESS POST PEAK BP: NORMAL MMHG
STRESS POST PEAK HR: 163 BPM
STRESS TARGET HR: 151 BPM

## 2019-01-17 ENCOUNTER — APPOINTMENT (OUTPATIENT)
Dept: CARDIOLOGY | Facility: HOSPITAL | Age: 44
End: 2019-01-17
Attending: INTERNAL MEDICINE

## 2019-01-17 ENCOUNTER — OFFICE VISIT (OUTPATIENT)
Dept: CARDIOLOGY | Facility: CLINIC | Age: 44
End: 2019-01-17

## 2019-01-17 VITALS
SYSTOLIC BLOOD PRESSURE: 123 MMHG | HEART RATE: 86 BPM | DIASTOLIC BLOOD PRESSURE: 75 MMHG | BODY MASS INDEX: 32.17 KG/M2 | RESPIRATION RATE: 16 BRPM | WEIGHT: 170.4 LBS | HEIGHT: 61 IN

## 2019-01-17 DIAGNOSIS — Z82.49 FAMILY HISTORY OF PREMATURE CORONARY ARTERY DISEASE: ICD-10-CM

## 2019-01-17 DIAGNOSIS — R07.2 PRECORDIAL PAIN: ICD-10-CM

## 2019-01-17 DIAGNOSIS — R06.02 SHORTNESS OF BREATH: Primary | ICD-10-CM

## 2019-01-17 PROCEDURE — 99213 OFFICE O/P EST LOW 20 MIN: CPT | Performed by: PHYSICIAN ASSISTANT

## 2019-01-17 NOTE — PROGRESS NOTES
"Kitty Jennings PA  Carlota Apodaca  1975 01/17/2019    Patient Active Problem List   Diagnosis   • DARREN (obstructive sleep apnea)   • Shortness of breath   • Precordial pain   • Syncope   • Family history of premature coronary artery disease   • Palpitations       Dear Kitty Jennings PA:    Subjective       History of Present Illness:    Chief Complaint   Patient presents with   • Chest Pain     abnormal stress echo        Carlota Apodaca is a pleasant 43 y.o. female with a past medical history significant for history of precordial pain is in shortness of breath.  She also has history of obstructive sleep apnea and family history of premature coronary artery disease.  She comes in to discuss results of her recent stress echo.    Patient states that she has been having shortness of breath with chest pain. She states that the pain feels like a \"weight\" on her chest. This makes the breathing worse. She states that it comes on at random but exertion can help bring it on as well. She state that this pain will anywhere from 5-10 mintues. She states sitting down and relaxing will help this pain.     Allergies   Allergen Reactions   • Hydrocodone Other (See Comments)     Exact reaction unknown   :      Current Outpatient Medications:   •  aspirin 81 MG tablet, Take 1 tablet by mouth Daily., Disp: 30 tablet, Rfl: 1  •  citalopram (CeleXA) 20 MG tablet, Take 20 mg by mouth Daily., Disp: , Rfl:   •  lansoprazole (PREVACID) 15 MG capsule, Take 15 mg by mouth Daily., Disp: , Rfl:   •  lansoprazole (PREVACID) 30 MG capsule, , Disp: , Rfl:   •  loratadine (CLARITIN) 10 MG tablet, Take 10 mg by mouth Daily., Disp: , Rfl:   •  omeprazole (prilOSEC) 10 MG capsule, Take 10 mg by mouth Daily., Disp: , Rfl:   •  ondansetron (ZOFRAN) 4 MG tablet, Take 1 tablet by mouth Every 8 (Eight) Hours As Needed for Nausea or Vomiting., Disp: 12 tablet, Rfl: 0  •  PARoxetine (PAXIL) 20 MG tablet, , Disp: , Rfl:       The following portions " "of the patient's history were reviewed and updated as appropriate: allergies, current medications, past family history, past medical history, past social history, past surgical history and problem list.    Social History     Tobacco Use   • Smoking status: Never Smoker   • Smokeless tobacco: Never Used   Substance Use Topics   • Alcohol use: No   • Drug use: No       Review of Systems   Constitution: Negative for weakness and malaise/fatigue.   Cardiovascular: Positive for chest pain and palpitations. Negative for dyspnea on exertion, irregular heartbeat, leg swelling and syncope.   Respiratory: Positive for shortness of breath. Negative for cough.    Hematologic/Lymphatic: Negative for bleeding problem. Does not bruise/bleed easily.   Gastrointestinal: Negative for nausea and vomiting.   Neurological: Negative for dizziness.       Objective   Vitals:    01/17/19 1556   BP: 123/75   BP Location: Left arm   Pulse: 86   Resp: 16   Weight: 77.3 kg (170 lb 6.4 oz)   Height: 154.9 cm (60.98\")     Body mass index is 32.21 kg/m².        Physical Exam   Constitutional: She is oriented to person, place, and time. She appears well-developed and well-nourished. No distress.   HENT:   Head: Normocephalic and atraumatic.   Cardiovascular: Normal rate, regular rhythm, normal heart sounds and intact distal pulses.   Pulmonary/Chest: Effort normal and breath sounds normal. No respiratory distress.   Musculoskeletal: She exhibits no edema.   Neurological: She is alert and oriented to person, place, and time.   Skin: She is not diaphoretic.       Lab Results   Component Value Date    TSH 2.598 11/07/2018      No results found for: BNP    During this visit the following were done:  Labs Reviewed [x]    Labs Ordered []    Radiology Reports Reviewed [x]    Radiology Ordered []    PCP Records Reviewed []    Referring Provider Records Reviewed []    ER Records Reviewed []    Hospital Records Reviewed []    History Obtained From Family []  "   Radiology Images Reviewed []    Other Reviewed []    Records Requested []       Procedures      Assessment/Plan    Diagnosis Plan   1. Shortness of breath     2. Precordial pain     3. Family history of premature coronary artery disease            Recommendations:  1. I discussed with her the results of her stress echo.  2. I did offer her to start a trial of isosorbide mononitrate for her chest pain however she declined at this time wishing to wait for the results of her 30 day cardiac event monitor which she is still wearing.  3. Follow-up in 6 weeks.    Return in about 6 weeks (around 2/28/2019).    As always, I appreciate very much the opportunity to participate in the cardiovascular care of your patients.      With Best Regards,    CINDI Oro disclaimer:  Much of this encounter note is an electronic transcription/translation of spoken language to printed text. The electronic translation of spoken language may permit erroneous, or at times, nonsensical words or phrases to be inadvertently transcribed; Although I have reviewed the note for such errors, some may still exist.

## 2019-01-28 ENCOUNTER — TELEPHONE (OUTPATIENT)
Dept: CARDIOLOGY | Facility: CLINIC | Age: 44
End: 2019-01-28

## 2019-01-28 NOTE — TELEPHONE ENCOUNTER
Karlene from [facility] called on a few abnormal EKG readings.  On 25th 3:05pm pt triggered with no symptoms of tachycardia @ a rate of 150 BPM. Sinus tachycardia.   26th @ 12:34pm, Severe tachycardia @ 157 BPM . Auto triggered.   27th @ 7:55pm pt triggered 145 BPM .  Severe tachycardia. Sinus tachycardia  9:55   BPM  .     Faxing reports

## 2019-01-30 ENCOUNTER — TELEPHONE (OUTPATIENT)
Dept: CARDIOLOGY | Facility: CLINIC | Age: 44
End: 2019-01-30

## 2019-01-31 ENCOUNTER — TELEPHONE (OUTPATIENT)
Dept: CARDIOLOGY | Facility: CLINIC | Age: 44
End: 2019-01-31

## 2019-01-31 ENCOUNTER — OFFICE VISIT (OUTPATIENT)
Dept: CARDIOLOGY | Facility: CLINIC | Age: 44
End: 2019-01-31

## 2019-01-31 VITALS
OXYGEN SATURATION: 97 % | BODY MASS INDEX: 31.72 KG/M2 | HEART RATE: 92 BPM | HEIGHT: 61 IN | SYSTOLIC BLOOD PRESSURE: 116 MMHG | WEIGHT: 168 LBS | DIASTOLIC BLOOD PRESSURE: 80 MMHG

## 2019-01-31 DIAGNOSIS — R07.2 PRECORDIAL PAIN: Primary | ICD-10-CM

## 2019-01-31 DIAGNOSIS — R00.0 SINUS TACHYCARDIA SEEN ON CARDIAC MONITOR: ICD-10-CM

## 2019-01-31 DIAGNOSIS — R42 DIZZINESS: ICD-10-CM

## 2019-01-31 PROCEDURE — 99214 OFFICE O/P EST MOD 30 MIN: CPT | Performed by: INTERNAL MEDICINE

## 2019-01-31 NOTE — TELEPHONE ENCOUNTER
[facility] called with an urgent EKG on Carlota stated she was in sinus tach at 11:43 am no symptoms was reported and it was a auto triggered event.     Pt is in our office at this time to see .

## 2019-02-04 ENCOUNTER — LAB (OUTPATIENT)
Dept: LAB | Facility: HOSPITAL | Age: 44
End: 2019-02-04
Attending: INTERNAL MEDICINE

## 2019-02-04 ENCOUNTER — TELEPHONE (OUTPATIENT)
Dept: CARDIOLOGY | Facility: CLINIC | Age: 44
End: 2019-02-04

## 2019-02-04 DIAGNOSIS — R00.0 SINUS TACHYCARDIA SEEN ON CARDIAC MONITOR: ICD-10-CM

## 2019-02-04 LAB — TSH SERPL DL<=0.05 MIU/L-ACNC: 3.49 MIU/ML (ref 0.55–4.78)

## 2019-02-04 PROCEDURE — 36415 COLL VENOUS BLD VENIPUNCTURE: CPT

## 2019-02-04 PROCEDURE — 84443 ASSAY THYROID STIM HORMONE: CPT

## 2019-02-08 ENCOUNTER — TELEPHONE (OUTPATIENT)
Dept: CARDIOLOGY | Facility: CLINIC | Age: 44
End: 2019-02-08

## 2019-02-08 DIAGNOSIS — R00.0 TACHYCARDIA: ICD-10-CM

## 2019-02-08 DIAGNOSIS — R00.0 SINUS TACHYCARDIA: ICD-10-CM

## 2019-02-08 DIAGNOSIS — G90.A POTS (POSTURAL ORTHOSTATIC TACHYCARDIA SYNDROME): Primary | ICD-10-CM

## 2019-02-08 NOTE — TELEPHONE ENCOUNTER
Called patient wanted to let her know that Dr. Dominguez is referring her to Dr. Jade an electrophysiology for evaluations of her event monitor.

## 2019-02-19 PROCEDURE — 93272 ECG/REVIEW INTERPRET ONLY: CPT | Performed by: INTERNAL MEDICINE

## 2019-02-26 ENCOUNTER — OFFICE VISIT (OUTPATIENT)
Dept: PULMONOLOGY | Facility: CLINIC | Age: 44
End: 2019-02-26

## 2019-02-26 VITALS
OXYGEN SATURATION: 95 % | BODY MASS INDEX: 32.51 KG/M2 | HEART RATE: 106 BPM | HEIGHT: 61 IN | WEIGHT: 172.2 LBS | SYSTOLIC BLOOD PRESSURE: 101 MMHG | DIASTOLIC BLOOD PRESSURE: 62 MMHG

## 2019-02-26 DIAGNOSIS — G47.33 OSA (OBSTRUCTIVE SLEEP APNEA): Primary | ICD-10-CM

## 2019-02-26 PROCEDURE — 99214 OFFICE O/P EST MOD 30 MIN: CPT | Performed by: NURSE PRACTITIONER

## 2019-02-26 NOTE — PROGRESS NOTES
Interval history since last visit: None    Recent hospitalizations: None    Investigations (imaging, PFT's, labs, sleep study, record requests, etc.) Home Sleep Study, Echo    Have you had the Influenza Vaccine? no   Would you like to receive this Vaccine today? no    Have you had the Pneumonia Vaccine?  no  Would you like to receive this Vaccine today? no    Subjective    Carlota Aopdaca presents for the following Sleep Apnea      History of Present Illness     This Is here today to follow-up on sleep study results.  She states that she has been feeling well since her last visit and voices no major complaints.  Echo and sleep study were discussed with patient in detail.    Review of Systems   Constitutional: Negative for activity change, fatigue and unexpected weight change.   HENT: Negative for congestion, postnasal drip and rhinorrhea.    Respiratory: Negative for apnea, cough, chest tightness, shortness of breath and wheezing.    Cardiovascular: Negative for chest pain and palpitations.   Gastrointestinal: Negative for nausea.   Allergic/Immunologic: Negative for environmental allergies.   Psychiatric/Behavioral: Negative for agitation and confusion.       Active Problems:  Problem List Items Addressed This Visit        Respiratory    DARREN (obstructive sleep apnea) - Primary    Relevant Orders    Miscellaneous DME          Past Medical History:  Past Medical History:   Diagnosis Date   • Acid reflux    • Depression    • Elevated cholesterol        Family History:  Family History   Problem Relation Age of Onset   • No Known Problems Mother    • No Known Problems Father    • Heart disease Maternal Grandfather    • Heart disease Paternal Grandfather        Social History:  Social History     Tobacco Use   • Smoking status: Never Smoker   • Smokeless tobacco: Never Used   Substance Use Topics   • Alcohol use: No       Current Medications:  Current Outpatient Medications   Medication Sig Dispense Refill   • aspirin 81 MG  "tablet Take 1 tablet by mouth Daily. 30 tablet 1   • citalopram (CeleXA) 20 MG tablet Take 20 mg by mouth Daily.     • lansoprazole (PREVACID) 15 MG capsule Take 15 mg by mouth Daily.     • lansoprazole (PREVACID) 30 MG capsule      • loratadine (CLARITIN) 10 MG tablet Take 10 mg by mouth Daily.     • metoprolol tartrate (LOPRESSOR) 25 MG tablet Take 1 tablet by mouth 2 (Two) Times a Day. 60 tablet 11   • omeprazole (prilOSEC) 10 MG capsule Take 10 mg by mouth Daily.     • ondansetron (ZOFRAN) 4 MG tablet Take 1 tablet by mouth Every 8 (Eight) Hours As Needed for Nausea or Vomiting. 12 tablet 0   • PARoxetine (PAXIL) 20 MG tablet        No current facility-administered medications for this visit.        Allergies:  Allergies   Allergen Reactions   • Hydrocodone Other (See Comments)     Exact reaction unknown       Vitals:  /62   Pulse 106   Ht 154.9 cm (61\")   Wt 78.1 kg (172 lb 3.2 oz)   SpO2 95%   BMI 32.54 kg/m²     Imaging:    Imaging Results (most recent)     None          Pulmonary Functions Testing Results:    No results found for: FEV1, FVC, [id number], TLC, DLCO    Results for orders placed or performed in visit on 02/04/19   TSH   Result Value Ref Range    TSH 3.491 0.550 - 4.780 mIU/mL       Objective   Physical Exam     GENERAL APPEARANCE: Well developed, well nourished, alert and cooperative, and appears to be in no acute distress.    HEAD: normocephalic. Atraumatic.    EYES: PERRL, EOMI. Fundi normal, vision is grossly intact.    THROAT: Oral cavity and pharynx normal. No inflammation, swelling, exudate, or lesions.     NECK: Neck supple.  No thyromegaly.    CARDIAC: Normal S1 and S2. No S3, S4 or murmurs. Rhythm is regular. There is no peripheral edema, cyanosis or pallor. Extremities are warm and well perfused. Capillary refill is less than 2 seconds. No carotid bruits.    RESPIRATORY:Bilateral air entry positive. Breath sounds clear bilaterally upon auscultation. No wheezing, crackles or " rhonchi noted.    GI: Positive bowel sounds. Soft, nondistended, nontender.     MUSCULOSKELETAL: No significant deformity or joint abnormality. No edema. Peripheral pulses intact. No varicosities.    NEUROLOGICAL: Strength and sensation symmetric and intact throughout.     PSYCHIATRIC: The mental examination revealed the patient was oriented to person, place, and time.       Assessment/Plan      Obstructive sleep apnea  - Sleep study reviewed.  Shows mild sleep apnea.  - We'll order an AutoPap.  - Educated her to avoid sedatives and heavy alcohol use  -Patient's Body mass index is 32.54 kg/m². BMI is above normal parameters. Recommendations include: exercise counseling and nutrition counseling.  - She is a non smoker.  - Echo was reviewed and she is now following with cardiology for POST  -educated patient on the complications associated with untreated sleep apnea--that it increases risk of MI, stroke, depression, hypertension, heart failure, arrhythmias, coronary artery disease, and potential death due to complication of those previously mentioned.    Also patient was educated about the hazards of driving if they are sleep deprived and have sleep apnea.  Was instructed to not involve in driving or any activities which involves higher level of mental function-like operating a machine-if they're sleep deprived and not wearing their CPAP and sleep apnea is not treated.        ICD-10-CM ICD-9-CM   1. DARREN (obstructive sleep apnea) G47.33 327.23       Return in about 3 months (around 5/26/2019).

## 2019-03-08 ENCOUNTER — CONSULT (OUTPATIENT)
Dept: CARDIOLOGY | Facility: CLINIC | Age: 44
End: 2019-03-08

## 2019-03-08 VITALS
SYSTOLIC BLOOD PRESSURE: 104 MMHG | DIASTOLIC BLOOD PRESSURE: 68 MMHG | WEIGHT: 172 LBS | HEIGHT: 61 IN | HEART RATE: 89 BPM | BODY MASS INDEX: 32.47 KG/M2

## 2019-03-08 DIAGNOSIS — R55 VASOVAGAL SYNCOPE: ICD-10-CM

## 2019-03-08 DIAGNOSIS — R00.0 TACHYCARDIA: Primary | ICD-10-CM

## 2019-03-08 PROCEDURE — 99244 OFF/OP CNSLTJ NEW/EST MOD 40: CPT | Performed by: INTERNAL MEDICINE

## 2019-03-08 PROCEDURE — 93000 ELECTROCARDIOGRAM COMPLETE: CPT | Performed by: INTERNAL MEDICINE

## 2019-03-08 RX ORDER — BUPROPION HYDROCHLORIDE 150 MG/1
150 TABLET ORAL DAILY
COMMUNITY
End: 2022-08-15

## 2019-03-08 RX ORDER — FLUDROCORTISONE ACETATE 0.1 MG/1
0.1 TABLET ORAL DAILY
Qty: 30 TABLET | Refills: 11 | Status: SHIPPED | OUTPATIENT
Start: 2019-03-08 | End: 2020-02-24

## 2019-03-08 NOTE — PROGRESS NOTES
Carlota Apodaca  1975  PCP: Kitty Jennings PA    SUBJECTIVE:   Carlota Apodaca is a 43 y.o. female seen for a consultation visit regarding the following:     Chief Complaint:   Chief Complaint   Patient presents with   • Rapid Heart Rate     Consult    • Chest Pain   • Shortness of Breath   • Dizziness          Consultation is requested by Chad Dominguez MD for evaluation of Rapid Heart Rate (Consult ); Chest Pain; Shortness of Breath; and Dizziness        History:  This is a 43-year-old patient referred by Dr. Dominguez for episodes of tachycardia, dizziness, and one episode of syncope.  In December 2018 the patient was feeling very lightheaded and dizzy while using the restroom.  She thought it was her blood sugar.  She stood up and had a full syncopal event.  She remembers waking up on the floor.  Prior to the syncopal event she had tunnel vision and reports been unable to speak.  She has been frequently feeling lightheaded and dizzy mostly with standing or bending over.  She has had a significant amount of caffeine intake including greater than 3 cups of large coffee a day.  She also has poor exercise tolerance demonstrated by an exercise stress test where she was only able to walk 3 minutes.  Of note her last office checks and blood pressure on the stress test showed that she had low blood pressure.      Cardiac PMH: (Old records have been reviewed and summarized below)  1.  Depression  2.  Echo November 2018 normal EF  3.  Stress test-Jan 2019-poor exercise tolerance only able to walk 3 minutes.    Past Medical History, Past Surgical History, Family history, Social History, and Medications were all reviewed with the patient today and updated as necessary.       Current Outpatient Medications:   •  aspirin 81 MG tablet, Take 1 tablet by mouth Daily., Disp: 30 tablet, Rfl: 1  •  buPROPion XL (WELLBUTRIN XL) 150 MG 24 hr tablet, Take 150 mg by mouth Daily., Disp: , Rfl:   •  lansoprazole (PREVACID) 30 MG capsule,  "30 mg Daily., Disp: , Rfl:   •  loratadine (CLARITIN) 10 MG tablet, Take 10 mg by mouth Daily., Disp: , Rfl:   •  metoprolol tartrate (LOPRESSOR) 25 MG tablet, Take 1 tablet by mouth 2 (Two) Times a Day., Disp: 60 tablet, Rfl: 11  •  fludrocortisone 0.1 MG tablet, Take 1 tablet by mouth Daily., Disp: 30 tablet, Rfl: 11    Allergies   Allergen Reactions   • Hydrocodone Other (See Comments)     Exact reaction unknown         Past Medical History:   Diagnosis Date   • Acid reflux    • Depression    • Elevated cholesterol      Past Surgical History:   Procedure Laterality Date   •  SECTION     • OVARY SURGERY Right     Removed Cyst and Ovary     Family History   Problem Relation Age of Onset   • No Known Problems Mother    • No Known Problems Father    • Heart disease Maternal Grandfather    • Heart disease Paternal Grandfather      Social History     Tobacco Use   • Smoking status: Never Smoker   • Smokeless tobacco: Never Used   Substance Use Topics   • Alcohol use: No       ROS:    General: no recent weight loss/gain, weakness or fatigue  Skin: no rashes, lumps, or other skin changes  HEENT: + dizziness, lightheadedness,   Respiratory: no cough or hemoptysis  Cardiovascular: + palpitations, and tachycardia  Gastrointestinal: no black/tarry stools or diarrhea  Urinary: no change in frequency or urgency  Peripheral Vascular: no claudication or leg cramps  Musculoskeletal: no muscle or joint pain/stiffness  Psychiatric: no depression or excessive stress  Neurological: no sensory or motor loss, no syncope  Hematologic: no anemia, easy bruising or bleeding  Endocrine: no thyroid problems, nor heat or cold intolerance       PHYSICAL EXAM:   /68 (BP Location: Right arm, Patient Position: Sitting)   Pulse 89   Ht 154.9 cm (61\")   Wt 78 kg (172 lb)   BMI 32.50 kg/m²      Wt Readings from Last 5 Encounters:   19 78 kg (172 lb)   19 78.1 kg (172 lb 3.2 oz)   19 76.2 kg (168 lb)   19 " 77.3 kg (170 lb 6.4 oz)   01/07/19 76.4 kg (168 lb 6.4 oz)     BP Readings from Last 5 Encounters:   03/08/19 104/68   02/26/19 101/62   01/31/19 116/80   01/17/19 123/75   01/07/19 121/72       General-Well Nourished, Well developed  Eyes - PERRLA  Neck- supple, No mass  CV- regular rate and rhythm, no MRG  Lung- clear bilaterally  Abd- soft, +BS  Musc/skel - Norm strength and range of motion  Skin- warm and dry  Neuro - Alert & Oriented x 3, appropriate mood.    Medical problems and test results were reviewed with the patient today.     Results for orders placed or performed in visit on 02/04/19   TSH   Result Value Ref Range    TSH 3.491 0.550 - 4.780 mIU/mL         No results found for: CHOL, HDL, HDLC, LDL, LDLC, VLDL    EKG:  (EKG/Tracing has been independently visualized by me and summarized below)      ECG 12 Lead  Date/Time: 3/8/2019 10:08 AM  Performed by: Jered Deras MD  Authorized by: Jered Deras MD   Rhythm: sinus rhythm  Rate: normal  BPM: 89    Clinical impression: normal ECG            ASSESSMENT and PLAN  1.  Tachycardia-appears to be secondary to a mixture of have caffeine intake, significant deconditioning, and orthostatic hypotension.  She will continue on metoprolol 25 mg twice daily and we will add Florinef 0.1 mg each day for hypertension.  2.  Orthostatic hypotension-add Florinef 0.1 mg each day  3.  Vasovagal syncope secondary to orthostatic hypotension monitor with Florinef    Return in about 3 months (around 6/8/2019).            Jered Deras M.D., F.A.C.C, F.H.R.S.  Cardiology/Electrophysiology  03/08/19  10:07 AM

## 2019-03-10 ENCOUNTER — OFFICE VISIT (OUTPATIENT)
Dept: RETAIL CLINIC | Facility: CLINIC | Age: 44
End: 2019-03-10

## 2019-03-10 VITALS
BODY MASS INDEX: 32.4 KG/M2 | RESPIRATION RATE: 20 BRPM | DIASTOLIC BLOOD PRESSURE: 58 MMHG | WEIGHT: 171.6 LBS | OXYGEN SATURATION: 99 % | HEIGHT: 61 IN | TEMPERATURE: 98.5 F | HEART RATE: 97 BPM | SYSTOLIC BLOOD PRESSURE: 98 MMHG

## 2019-03-10 DIAGNOSIS — J30.89 NON-SEASONAL ALLERGIC RHINITIS, UNSPECIFIED TRIGGER: Primary | ICD-10-CM

## 2019-03-10 PROCEDURE — 99213 OFFICE O/P EST LOW 20 MIN: CPT | Performed by: NURSE PRACTITIONER

## 2019-03-10 RX ORDER — FLUTICASONE PROPIONATE 50 MCG
2 SPRAY, SUSPENSION (ML) NASAL DAILY
Qty: 1 BOTTLE | Refills: 0 | Status: SHIPPED | OUTPATIENT
Start: 2019-03-10 | End: 2019-04-09

## 2019-03-10 NOTE — PROGRESS NOTES
SUBJECTIVEBEGIN@  Carlota Apodaca is a 43 y.o. female.   Chief Complaint   Patient presents with   • Cough      Cough   This is a new problem. The current episode started yesterday. The problem has been gradually worsening. The cough is non-productive. Associated symptoms include headaches. Pertinent negatives include no fever, postnasal drip or rhinorrhea. Treatments tried: Tylenol  The treatment provided no relief.        Carlota Apodaca  presents to [facility] with cc of dry cough that started yesterday and says she has coughed so hard her head has hurt, denies fever. Reviewed the PMFSH.  See ROS.    The following portions of the patient's history were reviewed and updated as appropriate: allergies, current medications, past family history, past medical history, past social history, past surgical history and problem list.    Current Outpatient Medications:   •  aspirin 81 MG tablet, Take 1 tablet by mouth Daily., Disp: 30 tablet, Rfl: 1  •  buPROPion XL (WELLBUTRIN XL) 150 MG 24 hr tablet, Take 150 mg by mouth Daily., Disp: , Rfl:   •  fludrocortisone 0.1 MG tablet, Take 1 tablet by mouth Daily., Disp: 30 tablet, Rfl: 11  •  fluticasone (FLONASE) 50 MCG/ACT nasal spray, 2 sprays into the nostril(s) as directed by provider Daily for 30 days., Disp: 1 bottle, Rfl: 0  •  lansoprazole (PREVACID) 30 MG capsule, 30 mg Daily., Disp: , Rfl:   •  loratadine (CLARITIN) 10 MG tablet, Take 10 mg by mouth Daily., Disp: , Rfl:   •  metoprolol tartrate (LOPRESSOR) 25 MG tablet, Take 1 tablet by mouth 2 (Two) Times a Day., Disp: 60 tablet, Rfl: 11    Allergies   Allergen Reactions   • Hydrocodone Shortness Of Breath and Itching     Exact reaction unknown       Review of Systems   Constitutional: Negative for fever.   HENT: Negative for congestion, postnasal drip and rhinorrhea.    Respiratory: Positive for cough (dry hacky ).    Neurological: Positive for headaches.       Objective     Visit Vitals  BP 98/58   Pulse 97   Temp 98.5 °F (36.9 °C)  "(Temporal)   Resp 20   Ht 154.9 cm (61\")   Wt 77.8 kg (171 lb 9.6 oz)   SpO2 99%   BMI 32.42 kg/m²         Physical Exam   Constitutional: She is oriented to person, place, and time. She appears well-developed and well-nourished. No distress.   HENT:   Head: Normocephalic and atraumatic.   Right Ear: Tympanic membrane and external ear normal.   Left Ear: Tympanic membrane and external ear normal.   Nose: Mucosal edema present. Right sinus exhibits no maxillary sinus tenderness and no frontal sinus tenderness. Left sinus exhibits no maxillary sinus tenderness and no frontal sinus tenderness.   Mouth/Throat: Uvula is midline, oropharynx is clear and moist and mucous membranes are normal. No tonsillar abscesses.   Eyes: Conjunctivae and EOM are normal. Pupils are equal, round, and reactive to light.   Neck: Normal range of motion. Neck supple.   Cardiovascular: Normal rate, regular rhythm and normal heart sounds.   Pulmonary/Chest: Effort normal and breath sounds normal. No respiratory distress. She has no wheezes.   Abdominal: Soft. Bowel sounds are normal.   Musculoskeletal: Normal range of motion.   Lymphadenopathy:     She has no cervical adenopathy.   Neurological: She is alert and oriented to person, place, and time.   Skin: Skin is warm and dry. No rash noted.   Psychiatric: She has a normal mood and affect. Her behavior is normal. Judgment and thought content normal.   Nursing note and vitals reviewed.      Lab Results (last 24 hours)     ** No results found for the last 24 hours. **          Assessment/Plan   Carlota was seen today for cough.    Diagnoses and all orders for this visit:    Non-seasonal allergic rhinitis, unspecified trigger  -     fluticasone (FLONASE) 50 MCG/ACT nasal spray; 2 sprays into the nostril(s) as directed by provider Daily for 30 days.             "

## 2019-03-26 ENCOUNTER — OFFICE VISIT (OUTPATIENT)
Dept: CARDIOLOGY | Facility: CLINIC | Age: 44
End: 2019-03-26

## 2019-03-26 VITALS
HEIGHT: 61 IN | BODY MASS INDEX: 32.28 KG/M2 | DIASTOLIC BLOOD PRESSURE: 70 MMHG | HEART RATE: 80 BPM | SYSTOLIC BLOOD PRESSURE: 114 MMHG | WEIGHT: 171 LBS | OXYGEN SATURATION: 99 %

## 2019-03-26 DIAGNOSIS — R55 SYNCOPE, UNSPECIFIED SYNCOPE TYPE: ICD-10-CM

## 2019-03-26 DIAGNOSIS — R00.0 SINUS TACHYCARDIA: Primary | ICD-10-CM

## 2019-03-26 PROCEDURE — 99214 OFFICE O/P EST MOD 30 MIN: CPT | Performed by: INTERNAL MEDICINE

## 2019-03-26 RX ORDER — FEXOFENADINE HCL 180 MG/1
180 TABLET ORAL DAILY
COMMUNITY
End: 2019-08-22 | Stop reason: ALTCHOICE

## 2019-03-26 NOTE — PROGRESS NOTES
Kitty Jennings PA  Carlota Apodaca  1975 03/26/2019    Patient Active Problem List   Diagnosis   • DARREN (obstructive sleep apnea)   • Shortness of breath   • Precordial pain   • Syncope   • Family history of premature coronary artery disease   • Palpitations   • Dizziness   • Sinus tachycardia seen on cardiac monitor       Dear Kitty Jennings PA:    Subjective     Carlota Apodaca is a 43 y.o. female with the problems as listed above, presents    Chief complaint: Follow-up of history of syncope and recurrent episodes of sinus tachycardia    History of Present Illness: Ms. Apodaca is a pleasant 41-year-old  female with a history of syncope before Christmas of 2018.  She subsequently had an event monitor in January and February 2019 that revealed multiple episodes of sinus tachycardia up to 150-160 bpm with which she was apparently asymptomatic.  These episodes seem to occur at random with no relation to exertion.  Patient was recently seen by Dr. Deras the electrophysiologist who thought most of her tachycardia is related to caffeine and probably due to some orthostatic hypotension.  He initiated the patient on Florinef 0.1 mg daily.  Patient on today's visit states that she is feeling somewhat better.  She denies any further episodes of syncope or significant dizziness.  She denies significant palpitations.  She says she has cut down on the consumption of coffee to one cup a day.    Allergies   Allergen Reactions   • Hydrocodone Shortness Of Breath and Itching     Exact reaction unknown   :      Current Outpatient Medications:   •  aspirin 81 MG tablet, Take 1 tablet by mouth Daily., Disp: 30 tablet, Rfl: 1  •  buPROPion XL (WELLBUTRIN XL) 150 MG 24 hr tablet, Take 150 mg by mouth Daily., Disp: , Rfl:   •  fexofenadine (ALLEGRA) 180 MG tablet, Take 180 mg by mouth Daily., Disp: , Rfl:   •  fludrocortisone 0.1 MG tablet, Take 1 tablet by mouth Daily., Disp: 30 tablet, Rfl: 11  •  fluticasone  "(FLONASE) 50 MCG/ACT nasal spray, 2 sprays into the nostril(s) as directed by provider Daily for 30 days., Disp: 1 bottle, Rfl: 0  •  lansoprazole (PREVACID) 30 MG capsule, 30 mg Daily., Disp: , Rfl:   •  metoprolol tartrate (LOPRESSOR) 25 MG tablet, Take 1.5 tablets by mouth 2 (Two) Times a Day., Disp: 90 tablet, Rfl: 5      The following portions of the patient's history were reviewed and updated as appropriate: allergies, current medications, past family history, past medical history, past social history, past surgical history and problem list.    Social History     Tobacco Use   • Smoking status: Never Smoker   • Smokeless tobacco: Never Used   Substance Use Topics   • Alcohol use: No   • Drug use: No       Review of Systems   Cardiovascular: Negative for chest pain, leg swelling, palpitations and syncope.   Respiratory: Negative for shortness of breath.    Neurological: Positive for dizziness.       Objective   Vitals:    03/26/19 1520   BP: 114/70   BP Location: Left arm   Patient Position: Sitting   Cuff Size: Adult   Pulse: 80   SpO2: 99%   Weight: 77.6 kg (171 lb)   Height: 154.9 cm (61\")     Body mass index is 32.31 kg/m².        Physical Exam   Constitutional: She is oriented to person, place, and time. She appears well-developed and well-nourished.   HENT:   Mouth/Throat: Oropharynx is clear and moist.   Eyes: EOM are normal. Pupils are equal, round, and reactive to light.   Neck: Neck supple. No JVD present. No tracheal deviation present. No thyromegaly present.   Cardiovascular: Normal rate, regular rhythm, S1 normal and S2 normal. Exam reveals no gallop and no friction rub.   No murmur heard.  Pulmonary/Chest: Effort normal and breath sounds normal.   Abdominal: Soft. Bowel sounds are normal. She exhibits no mass. There is no tenderness.   Musculoskeletal: Normal range of motion. She exhibits no edema.   Lymphadenopathy:     She has no cervical adenopathy.   Neurological: She is alert and oriented to " person, place, and time.   Skin: Skin is warm and dry. No rash noted.   Psychiatric: She has a normal mood and affect.       Procedures    Assessment/Plan    Diagnosis Plan   1. Sinus tachycardia     2. Syncope, unspecified syncope type          Recommendations:  1. We will increase the metoprolol dose to 37.5 mg (1-1/2 tablet of 25 mg) twice a day as tolerated.  2. May continue the Florinef for now.  3. I have encouraged her to drink a lot of electrolyte drinks such as Gatorade to keep her blood pressure up.  4. I have encouraged her to continue to cut back on the caffeinated beverages.    Return in about 3 months (around 6/26/2019).    As always,  Kitty Saha  I appreciate very much the opportunity to participate in the cardiovascular care of your patients. Please do not hesitate to call me with any questions with regards to Carlota Apodaca's evaluation and management.           With Best Regards,        Chad Dominguez MD, [facility]    Dragon disclaimer:  Much of this encounter note is an electronic transcription/translation of spoken language to printed text. The electronic translation of spoken language may permit erroneous, or at times, nonsensical words or phrases to be inadvertently transcribed; Although I have reviewed the note for such errors, some may still exist.

## 2019-05-07 ENCOUNTER — OFFICE VISIT (OUTPATIENT)
Dept: PULMONOLOGY | Facility: CLINIC | Age: 44
End: 2019-05-07

## 2019-05-07 VITALS
OXYGEN SATURATION: 97 % | DIASTOLIC BLOOD PRESSURE: 76 MMHG | SYSTOLIC BLOOD PRESSURE: 113 MMHG | HEART RATE: 74 BPM | TEMPERATURE: 98 F | BODY MASS INDEX: 25.55 KG/M2 | HEIGHT: 66 IN | WEIGHT: 159 LBS

## 2019-05-07 DIAGNOSIS — G47.33 OSA (OBSTRUCTIVE SLEEP APNEA): Primary | ICD-10-CM

## 2019-05-07 PROCEDURE — 99213 OFFICE O/P EST LOW 20 MIN: CPT | Performed by: PHYSICIAN ASSISTANT

## 2019-05-07 NOTE — PROGRESS NOTES
Interval history since last visit: None    Recent hospitalizations: None    Investigations (imaging, PFT's, labs, sleep study, record requests, etc.) None    Have you had the Influenza Vaccine? no   Would you like to receive this Vaccine today? no    Have you had the Pneumonia Vaccine?  no  Would you like to receive this Vaccine today? no    Subjective    Carlota Apodaca presents for the following Sleep Apnea      History of Present Illness     Ms. Apodaca is here for follow up of obstructive sleep apnea. She was ordered an autopap following the previous visit as her sleep study was positive for mild obstructive sleep apnea with recommended AutoPAP use. She reports that she did not receive the AutoPap machine, but had discussed the importance of weight loss at the previous visit. She reports that she has lost weight since the last visit. She denies morning headaches, significant fatigue. She believes she does not need an autopap at this time.       Review of Systems   Constitutional: Negative for activity change, fatigue and unexpected weight change.   HENT: Negative for congestion, postnasal drip and rhinorrhea.    Respiratory: Negative for apnea, cough, chest tightness, shortness of breath and wheezing.    Cardiovascular: Negative for chest pain and palpitations.   Gastrointestinal: Negative for abdominal pain and nausea.   Endocrine: Negative for cold intolerance and heat intolerance.   Musculoskeletal: Negative for arthralgias and gait problem.   Skin: Negative for color change and pallor.   Allergic/Immunologic: Negative for environmental allergies.   Neurological: Negative for dizziness and numbness.   Psychiatric/Behavioral: Negative for agitation and confusion.       Active Problems:  Problem List Items Addressed This Visit     None          Past Medical History:  Past Medical History:   Diagnosis Date   • Acid reflux    • Depression    • Elevated cholesterol    • Heart disease     palpatations        Family  "History:  Family History   Problem Relation Age of Onset   • Cancer Mother         breast bilateral   • Heart disease Mother    • Obesity Mother    • Diabetes Father    • Heart disease Maternal Grandfather    • Cancer Maternal Grandfather    • Heart disease Paternal Grandfather    • Diabetes Paternal Grandfather    • Stroke Paternal Grandfather    • Thyroid disease Sister    • Diabetes Brother    • Diabetes Brother    • Thyroid disease Brother        Social History:  Social History     Tobacco Use   • Smoking status: Never Smoker   • Smokeless tobacco: Never Used   Substance Use Topics   • Alcohol use: No       Current Medications:  Current Outpatient Medications   Medication Sig Dispense Refill   • aspirin 81 MG tablet Take 1 tablet by mouth Daily. 30 tablet 1   • buPROPion XL (WELLBUTRIN XL) 150 MG 24 hr tablet Take 150 mg by mouth Daily.     • fexofenadine (ALLEGRA) 180 MG tablet Take 180 mg by mouth Daily.     • fludrocortisone 0.1 MG tablet Take 1 tablet by mouth Daily. 30 tablet 11   • lansoprazole (PREVACID) 30 MG capsule 30 mg Daily.     • metoprolol tartrate (LOPRESSOR) 25 MG tablet Take 1.5 tablets by mouth 2 (Two) Times a Day. 90 tablet 5     No current facility-administered medications for this visit.        Allergies:  Allergies   Allergen Reactions   • Hydrocodone Shortness Of Breath and Itching     Exact reaction unknown       Vitals:  /76   Pulse 74   Temp 98 °F (36.7 °C) (Oral)   Ht 167.6 cm (66\")   Wt 72.1 kg (159 lb)   SpO2 97%   BMI 25.66 kg/m²     Imaging:    Imaging Results (most recent)     None          Pulmonary Functions Testing Results:    No results found for: FEV1, FVC, TIH8PWS, TLC, DLCO    Results for orders placed or performed in visit on 02/04/19   TSH   Result Value Ref Range    TSH 3.491 0.550 - 4.780 mIU/mL       Objective   Physical Exam     General - well developed. Well nourished.  No signs of acute distress.    HEENT - pupils equally reactive to light, normal in " size, no scleral icterus    Neck - supple. No thyromegaly.     Respiratory - Normal rate and effort. Air entry positive and equal bilaterally. No wheezing, crackles or rales on auscultation.     Cardiovascular - Normal S1 and S2. No S3, S4 or murmurs. No edema, pulses normal bilaterally.     GI - nontender nondistended. Active bowel sounds.    CNS - strength and sensation equal bilaterally.     Musculoskeletal - no edema. No visible joint deformity.     Psychiatric - mood good, good eye contact. Alert and oriented.        Assessment/Plan     I have reviewed the past medical history, family history, social history, surgical history, and allergies.     Reviewed previous sleep study results.     Reviewed previous labs. TSH within normal limits on 2/4/2019.     Reviewed previous stress echo completed on 1/15/2019. Stress test showed no ST segment deviation. Normal EKG interpretation. Normal left ventricular size and septal wall motion.   Previous echo completed on 11/13/2018 showed EF of 51-55%, no aortic valve stenosis/regurgiation, mild mitral valve regurgitation without stenosis. No tricuspid valve stenosis or regurgitation. No pericardial effusion.         ICD-10-CM ICD-9-CM   1. DARREN (obstructive sleep apnea) G47.33 327.23         Obstructive sleep apnea:   - Patient did not want to reordered AutoPAP at this time, as she had lost weight since the previous visit. Symptoms suggestive of sleep apnea are improved at this time. As symptoms are improved at this time and obstructive sleep apnea was classified as mild, will not reorder AutoPAP at this time.   - Discussed that continued weight maintenance will maintain symptom improvement.   - Recommended further thyroid testing/monitoring if symptoms return/worsen as this may impact sleep apnea.     Patient will follow up as needed upon symptom worsening.       Vaccinations:  Patient reported not up to date on influenza or pneumonia vaccination. Did not want to receive  these at this time.     Patient's Body mass index is 25.66 kg/m². BMI is within normal parameters. No follow-up required..        Return if symptoms worsen or fail to improve.

## 2019-08-22 ENCOUNTER — OFFICE VISIT (OUTPATIENT)
Dept: CARDIOLOGY | Facility: CLINIC | Age: 44
End: 2019-08-22

## 2019-08-22 VITALS
OXYGEN SATURATION: 99 % | HEIGHT: 66 IN | WEIGHT: 158.6 LBS | SYSTOLIC BLOOD PRESSURE: 101 MMHG | DIASTOLIC BLOOD PRESSURE: 69 MMHG | HEART RATE: 81 BPM | BODY MASS INDEX: 25.49 KG/M2

## 2019-08-22 DIAGNOSIS — R55 SYNCOPE, UNSPECIFIED SYNCOPE TYPE: Primary | ICD-10-CM

## 2019-08-22 DIAGNOSIS — R00.2 PALPITATIONS: ICD-10-CM

## 2019-08-22 PROCEDURE — 99212 OFFICE O/P EST SF 10 MIN: CPT | Performed by: INTERNAL MEDICINE

## 2019-08-22 RX ORDER — LORATADINE 10 MG/1
10 TABLET ORAL DAILY
COMMUNITY

## 2019-08-22 NOTE — PROGRESS NOTES
Kitty Jennings PA  Carlota Apodaca  1975 08/22/2019    Patient Active Problem List   Diagnosis   • DARREN (obstructive sleep apnea)   • Shortness of breath   • Precordial pain   • Syncope with no recent recurrence.   • Family history of premature coronary artery disease   • Palpitations   • Dizziness   • Sinus tachycardia seen on cardiac monitor       Dear Kitty Jennings PA:    Subjective     Carlota Apodaca is a 43 y.o. female with the problems as listed above, presents    Chief Complaint: For history of syncope and sinus tachycardia.       History of Present Illness: Ms. Apodaca is a pleasant 43-year-old  female with history of syncope around Christmas of 2018 with subsequent event monitor revealing episodes of sinus tachycardia up to 160 bpm.  She was subsequently seen by Dr. Deras electrophysiologist in [place] and was thought to have sinus tachycardia due to her orthostatic hypotension.  She was started on Florinef and her blood pressure is doing better as well as her symptoms.  She denies any further episodes of syncope or dizziness.  She has cut down on the caffeinated beverages.      Allergies   Allergen Reactions   • Hydrocodone Shortness Of Breath and Itching     Exact reaction unknown   :      Current Outpatient Medications:   •  aspirin 81 MG tablet, Take 1 tablet by mouth Daily., Disp: 30 tablet, Rfl: 1  •  buPROPion XL (WELLBUTRIN XL) 150 MG 24 hr tablet, Take 150 mg by mouth Daily., Disp: , Rfl:   •  fludrocortisone 0.1 MG tablet, Take 1 tablet by mouth Daily., Disp: 30 tablet, Rfl: 11  •  lansoprazole (PREVACID) 30 MG capsule, 30 mg Daily., Disp: , Rfl:   •  loratadine (CLARITIN) 10 MG tablet, Take 10 mg by mouth Daily., Disp: , Rfl:   •  metoprolol tartrate (LOPRESSOR) 25 MG tablet, Take 1.5 tablets by mouth 2 (Two) Times a Day., Disp: 90 tablet, Rfl: 5      The following portions of the patient's history were reviewed and updated as appropriate: allergies, current medications, past  "family history, past medical history, past social history, past surgical history and problem list.    Social History     Tobacco Use   • Smoking status: Never Smoker   • Smokeless tobacco: Never Used   Substance Use Topics   • Alcohol use: No   • Drug use: No       Review of Systems   Cardiovascular: Negative for chest pain, leg swelling, palpitations and syncope.   Respiratory: Negative for shortness of breath.    Neurological: Negative for dizziness.       Objective   Vitals:    08/22/19 1532   BP: 101/69   BP Location: Right arm   Patient Position: Sitting   Cuff Size: Adult   Pulse: 81   SpO2: 99%   Weight: 71.9 kg (158 lb 9.6 oz)   Height: 167.6 cm (66\")     Body mass index is 25.6 kg/m².      Physical Exam   Constitutional: She is oriented to person, place, and time. She appears well-developed and well-nourished.   HENT:   Mouth/Throat: Oropharynx is clear and moist.   Eyes: EOM are normal. Pupils are equal, round, and reactive to light.   Neck: Neck supple. No JVD present. No tracheal deviation present. No thyromegaly present.   Cardiovascular: Normal rate, regular rhythm, S1 normal and S2 normal. Exam reveals no gallop and no friction rub.   No murmur heard.  Pulmonary/Chest: Effort normal and breath sounds normal.   Abdominal: Soft. Bowel sounds are normal. She exhibits no mass. There is no tenderness.   Musculoskeletal: Normal range of motion. She exhibits no edema.   Lymphadenopathy:     She has no cervical adenopathy.   Neurological: She is alert and oriented to person, place, and time.   Skin: Skin is warm and dry. No rash noted.   Psychiatric: She has a normal mood and affect.       Procedures      Assessment/Plan    Diagnosis Plan   1. Syncope, unspecified syncope type     2. Palpitations       Recommendations:  1. Continue with Florinef 0.1 mg daily.  2. He did avoid caffeinated beverages.  3. Follow up in 9 months.    Return in about 9 months (around 5/22/2020).    As always, Kitty Saha I" appreciate very much the opportunity to participate in the cardiovascular care of your patients. Please do not hesitate to call me with any questions with regards to Carlota IRVIN Constanza's evaluation and management.       With Best Regards,        Chad Dominguez MD, FACC    Please note that portions of this note were completed with a voice recognition program.

## 2020-02-24 RX ORDER — FLUDROCORTISONE ACETATE 0.1 MG/1
TABLET ORAL
Qty: 30 TABLET | Refills: 11 | Status: SHIPPED | OUTPATIENT
Start: 2020-02-24 | End: 2021-02-26

## 2020-05-28 ENCOUNTER — OFFICE VISIT (OUTPATIENT)
Dept: CARDIOLOGY | Facility: CLINIC | Age: 45
End: 2020-05-28

## 2020-05-28 VITALS
HEART RATE: 87 BPM | HEIGHT: 61 IN | DIASTOLIC BLOOD PRESSURE: 77 MMHG | WEIGHT: 175.2 LBS | BODY MASS INDEX: 33.08 KG/M2 | RESPIRATION RATE: 16 BRPM | TEMPERATURE: 97.8 F | SYSTOLIC BLOOD PRESSURE: 121 MMHG

## 2020-05-28 DIAGNOSIS — R55 SYNCOPE, UNSPECIFIED SYNCOPE TYPE: Primary | ICD-10-CM

## 2020-05-28 PROCEDURE — 93000 ELECTROCARDIOGRAM COMPLETE: CPT | Performed by: PHYSICIAN ASSISTANT

## 2020-05-28 PROCEDURE — 99213 OFFICE O/P EST LOW 20 MIN: CPT | Performed by: PHYSICIAN ASSISTANT

## 2020-05-28 NOTE — PROGRESS NOTES
"Kitty Jennings PA  Carlota Apodaca  1975 05/28/2020    Patient Active Problem List   Diagnosis   • DARREN (obstructive sleep apnea)   • Shortness of breath   • Precordial pain   • Syncope with no recent recurrence.   • Family history of premature coronary artery disease   • Palpitations   • Dizziness   • Sinus tachycardia seen on cardiac monitor       Dear Kitty Jennings PA:    Subjective     History of Present Illness:    Chief Complaint   Patient presents with   • Palpitations     9 mos follow   • Chest Pain     vague, \"heartburn\" sx's   • Med Management     verbal       Carlota Apodaca is a pleasant 44 y.o. female with a past medical history significant for remote history of syncope with subsequent diagnosis of postural orthostatic tachycardia syndrome that is been stable while on Florinef. She is here today for routine cardiology follow up.     Mrs. Apodaca reports that she has been doing well since she was last seen.  She has had 0 episodes of syncope or near syncope since she was last seen.  She also denies any palpitations, shortness of breath, or chest pains.  Blood pressure is well controlled in the office today and she reports that her primary care provider has been refilling her cardiac medicines.    Allergies   Allergen Reactions   • Hydrocodone Shortness Of Breath and Itching     Exact reaction unknown   :      Current Outpatient Medications:   •  aspirin 81 MG tablet, Take 1 tablet by mouth Daily., Disp: 30 tablet, Rfl: 1  •  buPROPion XL (WELLBUTRIN XL) 150 MG 24 hr tablet, Take 150 mg by mouth Daily., Disp: , Rfl:   •  fludrocortisone 0.1 MG tablet, TAKE ONE TABLET BY MOUTH DAILY AS DIRECTED, Disp: 30 tablet, Rfl: 11  •  lansoprazole (PREVACID) 30 MG capsule, 30 mg Daily., Disp: , Rfl:   •  loratadine (CLARITIN) 10 MG tablet, Take 10 mg by mouth Daily., Disp: , Rfl:   •  metoprolol tartrate (LOPRESSOR) 25 MG tablet, Take 1.5 tablets by mouth 2 (Two) Times a Day., Disp: 90 tablet, Rfl: 3    The " "following portions of the patient's history were reviewed and updated as appropriate: allergies, current medications, past family history, past medical history, past social history, past surgical history and problem list.    Social History     Tobacco Use   • Smoking status: Never Smoker   • Smokeless tobacco: Never Used   Substance Use Topics   • Alcohol use: No   • Drug use: No       Review of Systems   Constitution: Negative for malaise/fatigue.   Cardiovascular: Positive for chest pain. Negative for dyspnea on exertion, irregular heartbeat, leg swelling and palpitations.   Respiratory: Negative for cough and shortness of breath.    Hematologic/Lymphatic: Negative for bleeding problem. Does not bruise/bleed easily.   Gastrointestinal: Negative for nausea and vomiting.   Neurological: Negative for weakness.       Objective   Vitals:    05/28/20 1451   BP: 121/77   Pulse: 87   Resp: 16   Temp: 97.8 °F (36.6 °C)   Weight: 79.5 kg (175 lb 3.2 oz)   Height: 154.9 cm (61\")     Body mass index is 33.1 kg/m².    Physical Exam   Constitutional: She is oriented to person, place, and time. She appears well-developed and well-nourished. No distress.   HENT:   Head: Normocephalic and atraumatic.   Cardiovascular: Normal rate, regular rhythm and normal heart sounds.   Pulmonary/Chest: Effort normal and breath sounds normal. No respiratory distress.   Musculoskeletal: She exhibits no edema.   Neurological: She is alert and oriented to person, place, and time.   Skin: She is not diaphoretic.       No results found for: NA, K, CL, CO2, BUN, CREATININE, LABGLOM, GLUCOSE, CALCIUM, AST, ALT, ALKPHOS, LABIL2  No results found for: CKTOTAL  No results found for: WBC, HGB, HCT, PLT  No results found for: INR  No results found for: MG  Lab Results   Component Value Date    TSH 3.491 02/04/2019      No results found for: BNP    During this visit the following were done:  Labs Reviewed [x]    Labs Ordered []    Radiology Reports Reviewed " [x]    Radiology Ordered []    PCP Records Reviewed []    Referring Provider Records Reviewed []    ER Records Reviewed []    Hospital Records Reviewed []    History Obtained From Family []    Radiology Images Reviewed []    Other Reviewed []    Records Requested []         ECG 12 Lead  Date/Time: 5/28/2020 3:00 PM  Performed by: Jamari White PA-C  Authorized by: Jamari White PA-C   Comparison: compared with previous ECG   Similar to previous ECG  Rhythm: sinus rhythm  Conduction: conduction normal  ST Segments: ST segments normal  T inversion: V1    Clinical impression: normal ECG          Assessment/Plan    Diagnosis Plan   1. Syncope, unspecified syncope type              Recommendations:  1. Overall patient is stable from cardiac standpoint.  I will continue metoprolol and fludrocortisone.  Since patient has not had a syncopal episode and in over a year and has no other major cardiac medical problems I will make her as needed    As always, I appreciate very much the opportunity to participate in the cardiovascular care of your patients.      With Best Regards,    Jamari White PA-C

## 2021-01-06 ENCOUNTER — HOSPITAL ENCOUNTER (OUTPATIENT)
Dept: ULTRASOUND IMAGING | Facility: HOSPITAL | Age: 46
Discharge: HOME | End: 2021-01-06

## 2021-01-06 ENCOUNTER — HOSPITAL ENCOUNTER (OUTPATIENT)
Dept: MAMMOGRAPHY | Facility: HOSPITAL | Age: 46
Discharge: HOME | End: 2021-01-06

## 2021-01-06 DIAGNOSIS — N64.4 MASTODYNIA: ICD-10-CM

## 2021-01-06 PROCEDURE — 77066 DX MAMMO INCL CAD BI: CPT

## 2021-01-06 PROCEDURE — 77066 DX MAMMO INCL CAD BI: CPT | Performed by: RADIOLOGY

## 2021-01-06 PROCEDURE — 77062 BREAST TOMOSYNTHESIS BI: CPT | Performed by: RADIOLOGY

## 2021-01-06 PROCEDURE — G0279 TOMOSYNTHESIS, MAMMO: HCPCS

## 2021-01-06 PROCEDURE — 76642 ULTRASOUND BREAST LIMITED: CPT | Performed by: RADIOLOGY

## 2021-01-06 PROCEDURE — 76642 ULTRASOUND BREAST LIMITED: CPT

## 2021-01-08 ENCOUNTER — TELEPHONE (OUTPATIENT)
Dept: PULMONOLOGY | Facility: CLINIC | Age: 46
End: 2021-01-08

## 2021-01-08 NOTE — TELEPHONE ENCOUNTER
RECEIVED AN IN-BASKET THAT THE PT NEEDS TO SCHEDULE AN APPOINTMENT. CONTACTED PT TO GET DETAILS NO ANSWER.

## 2021-02-26 RX ORDER — FLUDROCORTISONE ACETATE 0.1 MG/1
TABLET ORAL
Qty: 30 TABLET | Refills: 11 | Status: SHIPPED | OUTPATIENT
Start: 2021-02-26 | End: 2021-10-15 | Stop reason: ALTCHOICE

## 2021-03-16 ENCOUNTER — BULK ORDERING (OUTPATIENT)
Dept: CASE MANAGEMENT | Facility: OTHER | Age: 46
End: 2021-03-16

## 2021-03-16 DIAGNOSIS — Z23 IMMUNIZATION DUE: ICD-10-CM

## 2021-07-26 ENCOUNTER — TELEPHONE (OUTPATIENT)
Dept: CARDIOLOGY | Facility: CLINIC | Age: 46
End: 2021-07-26

## 2021-07-26 NOTE — TELEPHONE ENCOUNTER
Pt called and left a message that she has stopped her fludrocortisone d/t swelling. I tried to call her back no answer and unable to leave a voicemail. Pt has not been seen since 2019. She needs to schedule an appt.

## 2021-10-01 ENCOUNTER — TELEPHONE (OUTPATIENT)
Dept: CARDIOLOGY | Facility: CLINIC | Age: 46
End: 2021-10-01

## 2021-10-01 NOTE — TELEPHONE ENCOUNTER
Patient called and said that she is completley out of medication. Patient has not been here since 2020. Patient is scheduled to come in 10/15/2021.     Patient needs: Metoprolol     Thanks!

## 2021-10-15 ENCOUNTER — OFFICE VISIT (OUTPATIENT)
Dept: CARDIOLOGY | Facility: CLINIC | Age: 46
End: 2021-10-15

## 2021-10-15 VITALS
BODY MASS INDEX: 33.27 KG/M2 | SYSTOLIC BLOOD PRESSURE: 109 MMHG | TEMPERATURE: 98 F | DIASTOLIC BLOOD PRESSURE: 72 MMHG | HEIGHT: 61 IN | WEIGHT: 176.2 LBS | HEART RATE: 76 BPM

## 2021-10-15 DIAGNOSIS — R55 SYNCOPE, UNSPECIFIED SYNCOPE TYPE: Primary | ICD-10-CM

## 2021-10-15 PROCEDURE — 99213 OFFICE O/P EST LOW 20 MIN: CPT | Performed by: INTERNAL MEDICINE

## 2021-10-15 PROCEDURE — 93000 ELECTROCARDIOGRAM COMPLETE: CPT | Performed by: INTERNAL MEDICINE

## 2021-10-15 NOTE — PROGRESS NOTES
Kitty Jennings PA  Carlota Apodaca  1975  10/15/2021    Patient Active Problem List   Diagnosis   • DARREN (obstructive sleep apnea)   • Shortness of breath   • Precordial pain   • Syncope with no recent recurrence.   • Family history of premature coronary artery disease   • Palpitations   • Dizziness   • Sinus tachycardia seen on cardiac monitor       Dear Kitty Jennings PA:    Subjective     Carlota Apodaca is a 45 y.o. female with the problems as listed above, presents    Chief Complaint   Patient presents with   • Follow-up of Syncope       History of Present Illness: Ms. Apodaca is a pleasant 45-year-old  female with history of syncope in the past, has been doing fairly well in the last couple of years. She is here for regular cardiology follow-up. On today's visit she denies any recent episodes of syncope or significant dizziness. She gets mildly dizzy occasionally. She apparently ran out of medications for couple of days and got dizzy but is feeling better now. She states that she is trying to keep herself hydrated..    Interpretation Summary    · Stress Procedure:  · A stress test was performed following the Shoaib protocol.  · Exercise duration (min) 3 min Exercise duration (sec) 0 sec Estimated workload 4.6 METS  · Baseline Vitals Baseline  bpm Baseline /62 mmHg Peak Stress Vitals Peak  bpm Peak /61 mmHg Recovery Vitals Recovery HR 91 bpm Recovery /72 mmHg Exercise Data Target HR (85%) 151 bpm Max. Pred. HR (100%) 178 bpm Percent Max Pred HR 91.57 %  · There was no ST segment deviation noted during stress.  · Normal ECG stress ECG interpretation.  · Stress Echo Findings:  · Segment augmentation had a normal response to stress.  · Cavity size behaved normally in response to stress.  · Left ventricular function is normal. Septal wall motion is normal.  · Normal stress echo with no significant echocardiographic evidence for myocardial ischemia.      Allergies  "  Allergen Reactions   • Hydrocodone Shortness Of Breath and Itching     Exact reaction unknown       Current Outpatient Medications:   •  aspirin 81 MG tablet, Take 1 tablet by mouth Daily., Disp: 30 tablet, Rfl: 1  •  lansoprazole (PREVACID) 30 MG capsule, 30 mg Daily., Disp: , Rfl:   •  loratadine (CLARITIN) 10 MG tablet, Take 10 mg by mouth Daily., Disp: , Rfl:   •  metoprolol tartrate (LOPRESSOR) 25 MG tablet, Take 1.5 tablets by mouth 2 (Two) Times a Day., Disp: 270 tablet, Rfl: 3  •  buPROPion XL (WELLBUTRIN XL) 150 MG 24 hr tablet, Take 150 mg by mouth Daily., Disp: , Rfl:       The following portions of the patient's history were reviewed and updated as appropriate: allergies, current medications, past family history, past medical history, past social history, past surgical history and problem list.    Social History     Tobacco Use   • Smoking status: Never Smoker   • Smokeless tobacco: Never Used   Substance Use Topics   • Alcohol use: No   • Drug use: No       Review of Systems   Constitutional: Negative for chills and fever.   HENT: Negative for nosebleeds and sore throat.    Respiratory: Negative for cough, hemoptysis and wheezing.    Gastrointestinal: Negative for abdominal pain, hematemesis, hematochezia, melena, nausea and vomiting.   Genitourinary: Negative for dysuria and hematuria.   Neurological: Negative for headaches.     Objective   Vitals:    10/15/21 1049   BP: 109/72   Pulse: 76   Temp: 98 °F (36.7 °C)   Weight: 79.9 kg (176 lb 3.2 oz)   Height: 154.9 cm (61\")     Body mass index is 33.29 kg/m².    Vitals reviewed.   Constitutional:       Appearance: Well-developed.   Eyes:      Conjunctiva/sclera: Conjunctivae normal.   HENT:      Head: Normocephalic.   Neck:      Thyroid: No thyromegaly.      Vascular: No JVD.      Trachea: No tracheal deviation.   Pulmonary:      Effort: No respiratory distress.      Breath sounds: Normal breath sounds. No wheezing. No rales.   Cardiovascular:      PMI " at left midclavicular line. Normal rate. Regular rhythm. Normal S1. Normal S2.      Murmurs: There is no murmur.      No gallop. No click. No rub.   Pulses:     Intact distal pulses.   Edema:     Peripheral edema absent.   Abdominal:      General: Bowel sounds are normal.      Palpations: Abdomen is soft. There is no abdominal mass.      Tenderness: There is no abdominal tenderness.   Musculoskeletal:      Cervical back: Normal range of motion and neck supple. Skin:     General: Skin is warm and dry.   Neurological:      Mental Status: Alert and oriented to person, place, and time.      Cranial Nerves: No cranial nerve deficit.       Lab Results   Component Value Date    TSH 3.491 02/04/2019        ECG 12 Lead    Date/Time: 10/15/2021 10:49 AM  Performed by: Chad Dominguez MD  Authorized by: Chad Dominguez MD   Comparison: compared with previous ECG from 5/28/2020  Similar to previous ECG  Rhythm: sinus rhythm  BPM: 79  Conduction: conduction normal  Other findings: non-specific ST-T wave changes                Assessment/Plan :   Diagnosis Plan    Syncope, unspecified syncope type with no recurrence.           Recommendations:     Return in about 1 year (around 10/15/2022) for or sooner if needed.    As always, Kitty Jennings PA  I appreciate very much the opportunity to participate in the cardiovascular care of your patients. Please do not hesitate to call me with any questions with regards to Carlota Martinezp evaluation and management.       With Best Regards,        Chad Dominguez MD, [facility]    Please note that portions of this note were completed with a voice recognition program.

## 2022-01-19 ENCOUNTER — APPOINTMENT (OUTPATIENT)
Dept: GENERAL RADIOLOGY | Facility: HOSPITAL | Age: 47
End: 2022-01-19

## 2022-01-19 ENCOUNTER — APPOINTMENT (OUTPATIENT)
Dept: CT IMAGING | Facility: HOSPITAL | Age: 47
End: 2022-01-19

## 2022-01-19 ENCOUNTER — HOSPITAL ENCOUNTER (EMERGENCY)
Facility: HOSPITAL | Age: 47
Discharge: HOME | End: 2022-01-19
Attending: STUDENT IN AN ORGANIZED HEALTH CARE EDUCATION/TRAINING PROGRAM | Admitting: STUDENT IN AN ORGANIZED HEALTH CARE EDUCATION/TRAINING PROGRAM

## 2022-01-19 VITALS
SYSTOLIC BLOOD PRESSURE: 111 MMHG | HEART RATE: 86 BPM | WEIGHT: 165 LBS | TEMPERATURE: 98.6 F | RESPIRATION RATE: 18 BRPM | OXYGEN SATURATION: 98 % | DIASTOLIC BLOOD PRESSURE: 62 MMHG | BODY MASS INDEX: 32.39 KG/M2 | HEIGHT: 60 IN

## 2022-01-19 DIAGNOSIS — J06.9 VIRAL URI WITH COUGH: Primary | ICD-10-CM

## 2022-01-19 LAB
ALBUMIN SERPL-MCNC: 4.21 G/DL (ref 3.5–5.2)
ALBUMIN/GLOB SERPL: 1.3 G/DL
ALP SERPL-CCNC: 91 U/L (ref 39–117)
ALT SERPL W P-5'-P-CCNC: 15 U/L (ref 1–33)
ANION GAP SERPL CALCULATED.3IONS-SCNC: 10.8 MMOL/L (ref 5–15)
APTT PPP: 26.9 SECONDS (ref 25.5–35.4)
AST SERPL-CCNC: 15 U/L (ref 1–32)
B-HCG UR QL: NEGATIVE
BASOPHILS # BLD AUTO: 0.04 10*3/MM3 (ref 0–0.2)
BASOPHILS NFR BLD AUTO: 0.4 % (ref 0–1.5)
BILIRUB SERPL-MCNC: 0.3 MG/DL (ref 0–1.2)
BUN SERPL-MCNC: 9 MG/DL (ref 6–20)
BUN/CREAT SERPL: 10.7 (ref 7–25)
CALCIUM SPEC-SCNC: 9 MG/DL (ref 8.6–10.5)
CHLORIDE SERPL-SCNC: 106 MMOL/L (ref 98–107)
CO2 SERPL-SCNC: 23.2 MMOL/L (ref 22–29)
CREAT SERPL-MCNC: 0.84 MG/DL (ref 0.57–1)
CRP SERPL-MCNC: 0.69 MG/DL (ref 0–0.5)
D DIMER PPP FEU-MCNC: 0.39 MCGFEU/ML (ref 0–0.5)
DEPRECATED RDW RBC AUTO: 43.5 FL (ref 37–54)
EOSINOPHIL # BLD AUTO: 0.59 10*3/MM3 (ref 0–0.4)
EOSINOPHIL NFR BLD AUTO: 6.5 % (ref 0.3–6.2)
ERYTHROCYTE [DISTWIDTH] IN BLOOD BY AUTOMATED COUNT: 13.8 % (ref 12.3–15.4)
ERYTHROCYTE [SEDIMENTATION RATE] IN BLOOD: 17 MM/HR (ref 0–20)
FLUAV RNA RESP QL NAA+PROBE: NOT DETECTED
FLUBV RNA RESP QL NAA+PROBE: NOT DETECTED
GFR SERPL CREATININE-BSD FRML MDRD: 73 ML/MIN/1.73
GLOBULIN UR ELPH-MCNC: 3.2 GM/DL
GLUCOSE SERPL-MCNC: 97 MG/DL (ref 65–99)
HCT VFR BLD AUTO: 42.9 % (ref 34–46.6)
HGB BLD-MCNC: 13.4 G/DL (ref 12–15.9)
IMM GRANULOCYTES # BLD AUTO: 0.03 10*3/MM3 (ref 0–0.05)
IMM GRANULOCYTES NFR BLD AUTO: 0.3 % (ref 0–0.5)
INR PPP: 0.92 (ref 0.9–1.1)
LYMPHOCYTES # BLD AUTO: 1.58 10*3/MM3 (ref 0.7–3.1)
LYMPHOCYTES NFR BLD AUTO: 17.4 % (ref 19.6–45.3)
MCH RBC QN AUTO: 26.8 PG (ref 26.6–33)
MCHC RBC AUTO-ENTMCNC: 31.2 G/DL (ref 31.5–35.7)
MCV RBC AUTO: 85.8 FL (ref 79–97)
MONOCYTES # BLD AUTO: 0.37 10*3/MM3 (ref 0.1–0.9)
MONOCYTES NFR BLD AUTO: 4.1 % (ref 5–12)
NEUTROPHILS NFR BLD AUTO: 6.48 10*3/MM3 (ref 1.7–7)
NEUTROPHILS NFR BLD AUTO: 71.3 % (ref 42.7–76)
NRBC BLD AUTO-RTO: 0 /100 WBC (ref 0–0.2)
NT-PROBNP SERPL-MCNC: 84.7 PG/ML (ref 0–450)
PLATELET # BLD AUTO: 246 10*3/MM3 (ref 140–450)
PMV BLD AUTO: 9.8 FL (ref 6–12)
POTASSIUM SERPL-SCNC: 4.1 MMOL/L (ref 3.5–5.2)
PROT SERPL-MCNC: 7.4 G/DL (ref 6–8.5)
PROTHROMBIN TIME: 12.7 SECONDS (ref 12.8–14.5)
RBC # BLD AUTO: 5 10*6/MM3 (ref 3.77–5.28)
SARS-COV-2 RNA RESP QL NAA+PROBE: NOT DETECTED
SODIUM SERPL-SCNC: 140 MMOL/L (ref 136–145)
TROPONIN T SERPL-MCNC: <0.01 NG/ML (ref 0–0.03)
WBC NRBC COR # BLD: 9.09 10*3/MM3 (ref 3.4–10.8)

## 2022-01-19 PROCEDURE — 85025 COMPLETE CBC W/AUTO DIFF WBC: CPT | Performed by: STUDENT IN AN ORGANIZED HEALTH CARE EDUCATION/TRAINING PROGRAM

## 2022-01-19 PROCEDURE — 25010000002 METHYLPREDNISOLONE PER 40 MG: Performed by: PHYSICIAN ASSISTANT

## 2022-01-19 PROCEDURE — 99284 EMERGENCY DEPT VISIT MOD MDM: CPT

## 2022-01-19 PROCEDURE — 85730 THROMBOPLASTIN TIME PARTIAL: CPT | Performed by: STUDENT IN AN ORGANIZED HEALTH CARE EDUCATION/TRAINING PROGRAM

## 2022-01-19 PROCEDURE — 85610 PROTHROMBIN TIME: CPT | Performed by: STUDENT IN AN ORGANIZED HEALTH CARE EDUCATION/TRAINING PROGRAM

## 2022-01-19 PROCEDURE — 71046 X-RAY EXAM CHEST 2 VIEWS: CPT | Performed by: RADIOLOGY

## 2022-01-19 PROCEDURE — 94640 AIRWAY INHALATION TREATMENT: CPT

## 2022-01-19 PROCEDURE — 84484 ASSAY OF TROPONIN QUANT: CPT | Performed by: STUDENT IN AN ORGANIZED HEALTH CARE EDUCATION/TRAINING PROGRAM

## 2022-01-19 PROCEDURE — 81025 URINE PREGNANCY TEST: CPT | Performed by: STUDENT IN AN ORGANIZED HEALTH CARE EDUCATION/TRAINING PROGRAM

## 2022-01-19 PROCEDURE — 80053 COMPREHEN METABOLIC PANEL: CPT | Performed by: STUDENT IN AN ORGANIZED HEALTH CARE EDUCATION/TRAINING PROGRAM

## 2022-01-19 PROCEDURE — 93005 ELECTROCARDIOGRAM TRACING: CPT | Performed by: STUDENT IN AN ORGANIZED HEALTH CARE EDUCATION/TRAINING PROGRAM

## 2022-01-19 PROCEDURE — 71250 CT THORAX DX C-: CPT | Performed by: RADIOLOGY

## 2022-01-19 PROCEDURE — 85652 RBC SED RATE AUTOMATED: CPT | Performed by: STUDENT IN AN ORGANIZED HEALTH CARE EDUCATION/TRAINING PROGRAM

## 2022-01-19 PROCEDURE — 71046 X-RAY EXAM CHEST 2 VIEWS: CPT

## 2022-01-19 PROCEDURE — 71250 CT THORAX DX C-: CPT

## 2022-01-19 PROCEDURE — 99283 EMERGENCY DEPT VISIT LOW MDM: CPT

## 2022-01-19 PROCEDURE — 87636 SARSCOV2 & INF A&B AMP PRB: CPT | Performed by: STUDENT IN AN ORGANIZED HEALTH CARE EDUCATION/TRAINING PROGRAM

## 2022-01-19 PROCEDURE — 83880 ASSAY OF NATRIURETIC PEPTIDE: CPT | Performed by: STUDENT IN AN ORGANIZED HEALTH CARE EDUCATION/TRAINING PROGRAM

## 2022-01-19 PROCEDURE — 85379 FIBRIN DEGRADATION QUANT: CPT | Performed by: PHYSICIAN ASSISTANT

## 2022-01-19 PROCEDURE — 94799 UNLISTED PULMONARY SVC/PX: CPT

## 2022-01-19 PROCEDURE — 96374 THER/PROPH/DIAG INJ IV PUSH: CPT

## 2022-01-19 PROCEDURE — 86140 C-REACTIVE PROTEIN: CPT | Performed by: STUDENT IN AN ORGANIZED HEALTH CARE EDUCATION/TRAINING PROGRAM

## 2022-01-19 RX ORDER — METHYLPREDNISOLONE SODIUM SUCCINATE 40 MG/ML
80 INJECTION, POWDER, LYOPHILIZED, FOR SOLUTION INTRAMUSCULAR; INTRAVENOUS ONCE
Status: COMPLETED | OUTPATIENT
Start: 2022-01-19 | End: 2022-01-19

## 2022-01-19 RX ORDER — PREDNISONE 20 MG/1
60 TABLET ORAL ONCE
Status: DISCONTINUED | OUTPATIENT
Start: 2022-01-19 | End: 2022-01-19

## 2022-01-19 RX ORDER — METHYLPREDNISOLONE 4 MG/1
TABLET ORAL
Qty: 21 TABLET | Refills: 0 | Status: SHIPPED | OUTPATIENT
Start: 2022-01-19 | End: 2022-05-03

## 2022-01-19 RX ORDER — SODIUM CHLORIDE 0.9 % (FLUSH) 0.9 %
10 SYRINGE (ML) INJECTION AS NEEDED
Status: DISCONTINUED | OUTPATIENT
Start: 2022-01-19 | End: 2022-01-19 | Stop reason: HOSPADM

## 2022-01-19 RX ORDER — ALBUTEROL SULFATE 90 UG/1
6 AEROSOL, METERED RESPIRATORY (INHALATION) ONCE
Status: DISCONTINUED | OUTPATIENT
Start: 2022-01-19 | End: 2022-01-19

## 2022-01-19 RX ORDER — IPRATROPIUM BROMIDE AND ALBUTEROL SULFATE 2.5; .5 MG/3ML; MG/3ML
3 SOLUTION RESPIRATORY (INHALATION) ONCE
Status: COMPLETED | OUTPATIENT
Start: 2022-01-19 | End: 2022-01-19

## 2022-01-19 RX ADMIN — METHYLPREDNISOLONE SODIUM SUCCINATE 80 MG: 40 INJECTION, POWDER, FOR SOLUTION INTRAMUSCULAR; INTRAVENOUS at 19:39

## 2022-01-19 RX ADMIN — IPRATROPIUM BROMIDE AND ALBUTEROL SULFATE 3 ML: .5; 3 SOLUTION RESPIRATORY (INHALATION) at 19:37

## 2022-01-19 RX ADMIN — SODIUM CHLORIDE 1000 ML: 9 INJECTION, SOLUTION INTRAVENOUS at 19:39

## 2022-01-19 NOTE — ED NOTES
MEDICAL SCREENING:    Reason for Visit: PMH: being worked up for asthma, unspecified heart abnormality; SOB 1wk, cough x1mo. PERC 0.    Patient initially seen in triage.  The patient was advised further evaluation and diagnostic testing will be needed, some of the treatment and testing will be initiated in the lobby in order to begin the process.  The patient will be returned to the waiting area for the time being and possibly be re-assessed by a subsequent ED provider.  The patient will be brought back to the treatment area in as timely manner as possible.    Electronically signed by Ferny Myers MD, 01/19/22, 2:40 PM JOHN. Ferny Alcaraz MD  01/19/22 [id number]

## 2022-01-20 LAB
QT INTERVAL: 354 MS
QTC INTERVAL: 423 MS

## 2022-01-20 NOTE — DISCHARGE INSTRUCTIONS
Please take your medications as prescribed. Please follow up with your PCP in 2 days or return to ER if symptoms worsen.

## 2022-01-20 NOTE — ED PROVIDER NOTES
Subjective   This is a 46 year old female patient who presents to the ER with chief complaint of SOB and cough. PMH significant for palpitations. Patient has had cough and SOB as well as headache since before . She has undergone antibiotic and steroid therapy as well as an inhaler without relief of symptoms. She is scheduled to see a pulmonologist on . Denies chest pain, fever.           Review of Systems   Constitutional: Negative.  Negative for fever.   HENT: Negative.    Respiratory: Positive for cough and shortness of breath. Negative for apnea, choking, chest tightness, wheezing and stridor.    Cardiovascular: Negative.  Negative for chest pain.   Gastrointestinal: Negative.  Negative for abdominal pain.   Endocrine: Negative.    Genitourinary: Negative.  Negative for dysuria.   Skin: Negative.    Neurological: Negative.    Psychiatric/Behavioral: Negative.    All other systems reviewed and are negative.      Past Medical History:   Diagnosis Date   • Acid reflux    • Depression    • Elevated cholesterol    • Heart disease     palpatations        Allergies   Allergen Reactions   • Hydrocodone Shortness Of Breath and Itching     Exact reaction unknown       Past Surgical History:   Procedure Laterality Date   •  SECTION  99; ; 07    male, (twin boys-02), female   • ENDOMETRIAL ABLATION W/ NOVASURE  2018   • OVARY SURGERY Right     Removed Cyst and Ovary   • SALPINGO OOPHORECTOMY Right     cyst   • TUBAL ABDOMINAL LIGATION Left 2001       Family History   Problem Relation Age of Onset   • Cancer Mother         breast bilateral   • Heart disease Mother    • Obesity Mother    • Breast cancer Mother         age 40's lt breast age 6o's rt breast   • Diabetes Father    • Heart disease Maternal Grandfather    • Cancer Maternal Grandfather    • Heart disease Paternal Grandfather    • Diabetes Paternal Grandfather    • Stroke Paternal Grandfather    • Thyroid disease Sister    •  Diabetes Brother    • Diabetes Brother    • Thyroid disease Brother        Social History     Socioeconomic History   • Marital status:    Tobacco Use   • Smoking status: Never Smoker   • Smokeless tobacco: Never Used   Substance and Sexual Activity   • Alcohol use: No   • Drug use: No   • Sexual activity: Never     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   Constitutional:       General: She is not in acute distress.     Appearance: She is well-developed. She is not diaphoretic.   HENT:      Head: Normocephalic and atraumatic.      Right Ear: External ear normal.      Left Ear: External ear normal.      Nose: Nose normal.   Eyes:      Conjunctiva/sclera: Conjunctivae normal.      Pupils: Pupils are equal, round, and reactive to light.   Neck:      Vascular: No JVD.      Trachea: No tracheal deviation.   Cardiovascular:      Rate and Rhythm: Normal rate and regular rhythm.      Heart sounds: Normal heart sounds. No murmur heard.      Pulmonary:      Effort: Pulmonary effort is normal. No respiratory distress.      Breath sounds: Normal breath sounds. No decreased breath sounds, wheezing, rhonchi or rales.   Abdominal:      General: Bowel sounds are normal.      Palpations: Abdomen is soft.      Tenderness: There is no abdominal tenderness.   Musculoskeletal:         General: No deformity. Normal range of motion.      Cervical back: Normal range of motion and neck supple.   Skin:     General: Skin is warm and dry.      Coloration: Skin is not pale.      Findings: No erythema or rash.   Neurological:      Mental Status: She is alert and oriented to person, place, and time.      Cranial Nerves: No cranial nerve deficit.   Psychiatric:         Behavior: Behavior normal.         Thought Content: Thought content normal.         Procedures           ED Course  ED Course as of 01/19/22 2156   Wed Jan 19, 2022   1311 EKG  noted sinus rhythm.  86 bpm.  .  QRS 76.  QTc 423.  No acute ST elevation. [SF]   1810 XR Chest 2 View  IMPRESSION:    No acute findings in the chest.     This report was finalized on 1/19/2022 4:10 PM by Dr. Ferny Snowden MD. [SM]   1810 CT Chest Without Contrast Diagnostic  IMPRESSION:  1.  No acute thoracic findings identified.  2.  Nonobstructing left kidney stones. No hydronephrosis.     This report was finalized on 1/19/2022 4:11 PM by Dr. Ferny Snowden MD. [SM]   2041 Patient diagnosed with viral URI with cough. Will be d/c home with rx for medrol dose packet and instructions to continue inhaler. Will f/u with pulmonologist as scheduled or return to ER if symptoms worsen.  [MM]      ED Course User Index  [MM] Asya Carpenter PA  [SF] Naveed Calabrese,   [SM] Patricia Etienne, APRN                                                 MDM  Number of Diagnoses or Management Options     Amount and/or Complexity of Data Reviewed  Clinical lab tests: ordered and reviewed  Tests in the radiology section of CPT®: ordered and reviewed  Discuss the patient with other providers: yes        Final diagnoses:   Viral URI with cough       ED Disposition  ED Disposition     ED Disposition Condition Comment    Discharge Stable           Kitty Jennings PA  [address] [id number] [phone]    In 2 days           Medication List      New Prescriptions    methylPREDNISolone 4 MG dose pack  Commonly known as: MEDROL  Take as directed on package instructions.           Where to Get Your Medications      These medications were sent to [facility] [address] [place] [address] [phone] [place] [phone] [place] [address]    Phone: [phone]   · methylPREDNISolone 4 MG dose pack          Asya Carpenter PA  01/19/22 2044       Asya Carpenter PA  01/19/22 2156

## 2022-02-14 ENCOUNTER — OFFICE VISIT (OUTPATIENT)
Dept: PULMONOLOGY | Facility: CLINIC | Age: 47
End: 2022-02-14

## 2022-02-14 VITALS
BODY MASS INDEX: 32.1 KG/M2 | TEMPERATURE: 97.3 F | HEART RATE: 89 BPM | WEIGHT: 170 LBS | OXYGEN SATURATION: 98 % | HEIGHT: 61 IN | DIASTOLIC BLOOD PRESSURE: 62 MMHG | SYSTOLIC BLOOD PRESSURE: 117 MMHG

## 2022-02-14 DIAGNOSIS — G47.33 OSA (OBSTRUCTIVE SLEEP APNEA): ICD-10-CM

## 2022-02-14 DIAGNOSIS — R05.8 RECURRENT COUGH: Primary | ICD-10-CM

## 2022-02-14 DIAGNOSIS — R06.02 SHORTNESS OF BREATH: ICD-10-CM

## 2022-02-14 PROBLEM — I82.0 BUDD-CHIARI SYNDROME (HCC): Status: ACTIVE | Noted: 2022-02-14

## 2022-02-14 PROCEDURE — 99214 OFFICE O/P EST MOD 30 MIN: CPT | Performed by: PHYSICIAN ASSISTANT

## 2022-02-14 RX ORDER — BENZONATATE 100 MG/1
100 CAPSULE ORAL 3 TIMES DAILY PRN
Qty: 42 CAPSULE | Refills: 0 | Status: SHIPPED | OUTPATIENT
Start: 2022-02-14 | End: 2022-03-09 | Stop reason: SDUPTHER

## 2022-02-14 RX ORDER — DEXAMETHASONE 4 MG/1
2 TABLET ORAL
Qty: 1 EACH | Refills: 11 | Status: SHIPPED | OUTPATIENT
Start: 2022-02-14 | End: 2022-03-09

## 2022-02-14 NOTE — PROGRESS NOTES
"Chief Complaint  Sleep Apnea    Subjective          Carlota Apodaca presents to [facility]ULMONARY & [facility]IT[facility]ARE MEDICINE  History of Present Illness     Patient presents today following ED evaluation.  Previously followed with a several years prior for sleep apnea, but had never received the device.  She had lost weight after testing and proceeded with symptom monitoring at that time.    She stated that she developed a cough and intermittent shortness of breath before [place].  She completed antibiotic and steroid course as well as rescue inhaler without significant relief.  She states that cough can be very severe at times causing her to kneel on the floor and becoming near syncopal due to intense head pressure.  She has noted darkening vision changes at times of intense coughing.  She states that she battles intermittent illness similar to this various times throughout the year.  Suspect this may be related to seasonal changes but no definite triggers have been noted.  She typically notes some relief after steroid and antibiotic treatment.  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  She tells me that she has previously followed with neurology.  During the previous ED evaluation, she was negative for COVID-19.  CT chest was also without abnormality.   She states that symptoms are currently improved at this time.  She still notes an occasional cough, but it is much improved as compared to the last few months. Shortness of breath also improved.   No previous smoking history.        Objective   Vital Signs:   /62   Pulse 89   Temp 97.3 °F (36.3 °C) (Temporal)   Ht 154.9 cm (61\")   Wt 77.1 kg (170 lb)   SpO2 98%   BMI 32.12 kg/m²     Physical Exam  Vitals reviewed.   Constitutional:       General: She is not in acute distress.     Appearance: She is well-developed. She is not diaphoretic.   HENT:      " Head: Normocephalic and atraumatic.   Neck:      Thyroid: No thyromegaly.   Cardiovascular:      Rate and Rhythm: Normal rate and regular rhythm.      Heart sounds: Normal heart sounds, S1 normal and S2 normal.   Pulmonary:      Effort: Pulmonary effort is normal.      Breath sounds: No wheezing, rhonchi or rales.   Neurological:      Mental Status: She is alert and oriented to person, place, and time.   Psychiatric:         Behavior: Behavior normal.              Result Review :   The following data was reviewed by: Asha Andrews PA-C on 02/14/2022:    Reviewed the CT chest from January 2022.       Reviewed the home sleep study from 2018.    Reviewed the echocardiogram from November 2018.  Notable for Mild mitral valve regurgitation.        Assessment and Plan {CC Problem List  Visit Diagnosis   ROS  Review (Popup)  Socialance Maintenance  Quality  BestPractice  Medications  SmartSets  SnapShot Encounters  Media :23}   Diagnoses and all orders for this visit:    1. Recurrent cough (Primary)  -     Full Pulmonary Function Test With Bronchodilator; Future  -     Cancel: Adult Transthoracic Echo Complete W/ Cont if Necessary Per Protocol; Future  -     Adult Transthoracic Echo Complete W/ Cont if Necessary Per Protocol; Future    2. Shortness of breath  -     Cancel: Adult Transthoracic Echo Complete W/ Cont if Necessary Per Protocol; Future  -     Adult Transthoracic Echo Complete W/ Cont if Necessary Per Protocol; Future    3. DARREN (obstructive sleep apnea)    Other orders  -     fluticasone (Flovent HFA) 110 MCG/ACT inhaler; Inhale 2 puffs 2 (Two) Times a Day.  Dispense: 1 each; Refill: 11  -     benzonatate (TESSALON) 100 MG capsule; Take 1 capsule by mouth 3 (Three) Times a Day As Needed for Cough.  Dispense: 42 capsule; Refill: 0          Recurrent cough, shortness of breath:  · Continue albuterol inhaler as needed  · preferred to await nebulized therapy and order later if needed  · Ordered Tessalon  Perles for as needed use with coughing.  · Ordered PFT  · We will start on trial of moderate dose inhaled steroid as scheduled.  Reminded to rinse orally following use to avoid irritation.  May have some underlying asthmatic component.  Symptoms previously improved following oral steroid usage and inhaled rescue treatments.   · Ordered echocardiogram   requested bubble study for possible HPS (patient reports Budd-Chiari Syndrome)  · We will consider repeat CBC with differential at the next visit to monitor eosinophils.   If symptoms are not resolved following inhaler therapy, may consider biologic therapy.      Obstructive sleep apnea:  Previously did not receive her machine after sleep study revealed mild DARREN.  She had lost weight after the diagnosis, which may have resolved symptoms.  Not significantly completed with this time.   prefers to hold off on reordering device at this time.      Follow Up   Return in about 3 months (around 5/14/2022), or as needed, for Recheck.  Patient was given instructions and counseling regarding her condition or for health maintenance advice. Please see specific information pulled into the AVS if appropriate.

## 2022-02-25 ENCOUNTER — HOSPITAL ENCOUNTER (OUTPATIENT)
Dept: RESPIRATORY THERAPY | Facility: HOSPITAL | Age: 47
Discharge: HOME | End: 2022-02-25

## 2022-02-25 ENCOUNTER — HOSPITAL ENCOUNTER (OUTPATIENT)
Dept: CARDIOLOGY | Facility: HOSPITAL | Age: 47
Discharge: HOME | End: 2022-02-25

## 2022-02-25 ENCOUNTER — TRANSCRIBE ORDERS (OUTPATIENT)
Dept: LAB | Facility: HOSPITAL | Age: 47
End: 2022-02-25

## 2022-02-25 ENCOUNTER — LAB (OUTPATIENT)
Dept: LAB | Facility: HOSPITAL | Age: 47
End: 2022-02-25

## 2022-02-25 DIAGNOSIS — Z01.818 OTHER SPECIFIED PRE-OPERATIVE EXAMINATION: ICD-10-CM

## 2022-02-25 DIAGNOSIS — R06.02 SHORTNESS OF BREATH: ICD-10-CM

## 2022-02-25 DIAGNOSIS — R05.8 RECURRENT COUGH: ICD-10-CM

## 2022-02-25 DIAGNOSIS — Z01.818 OTHER SPECIFIED PRE-OPERATIVE EXAMINATION: Primary | ICD-10-CM

## 2022-02-25 LAB
BH CV ECHO MEAS - ACS: 1.5 CM
BH CV ECHO MEAS - AO MAX PG: 6.4 MMHG
BH CV ECHO MEAS - AO MEAN PG: 3 MMHG
BH CV ECHO MEAS - AO ROOT AREA (BSA CORRECTED): 1.4
BH CV ECHO MEAS - AO ROOT AREA: 4.9 CM^2
BH CV ECHO MEAS - AO ROOT DIAM: 2.5 CM
BH CV ECHO MEAS - AO V2 MAX: 126 CM/SEC
BH CV ECHO MEAS - AO V2 MEAN: 80.8 CM/SEC
BH CV ECHO MEAS - AO V2 VTI: 28.3 CM
BH CV ECHO MEAS - BSA(HAYCOCK): 1.9 M^2
BH CV ECHO MEAS - BSA: 1.8 M^2
BH CV ECHO MEAS - BZI_BMI: 32.1 KILOGRAMS/M^2
BH CV ECHO MEAS - BZI_METRIC_HEIGHT: 154.9 CM
BH CV ECHO MEAS - BZI_METRIC_WEIGHT: 77.1 KG
BH CV ECHO MEAS - EDV(CUBED): 57.5 ML
BH CV ECHO MEAS - EDV(MOD-SP4): 50.3 ML
BH CV ECHO MEAS - EDV(TEICH): 64.3 ML
BH CV ECHO MEAS - EF(CUBED): 69.1 %
BH CV ECHO MEAS - EF(MOD-SP4): 62.2 %
BH CV ECHO MEAS - EF(TEICH): 61.4 %
BH CV ECHO MEAS - ESV(CUBED): 17.8 ML
BH CV ECHO MEAS - ESV(MOD-SP4): 19 ML
BH CV ECHO MEAS - ESV(TEICH): 24.8 ML
BH CV ECHO MEAS - FS: 32.4 %
BH CV ECHO MEAS - IVS/LVPW: 0.81
BH CV ECHO MEAS - IVSD: 1 CM
BH CV ECHO MEAS - LV DIASTOLIC VOL/BSA (35-75): 28.5 ML/M^2
BH CV ECHO MEAS - LV MASS(C)D: 149.1 GRAMS
BH CV ECHO MEAS - LV MASS(C)DI: 84.6 GRAMS/M^2
BH CV ECHO MEAS - LV SYSTOLIC VOL/BSA (12-30): 10.8 ML/M^2
BH CV ECHO MEAS - LVIDD: 3.9 CM
BH CV ECHO MEAS - LVIDS: 2.6 CM
BH CV ECHO MEAS - LVLD AP4: 6.7 CM
BH CV ECHO MEAS - LVLS AP4: 6 CM
BH CV ECHO MEAS - LVPWD: 1.3 CM
BH CV ECHO MEAS - MV A MAX VEL: 65.1 CM/SEC
BH CV ECHO MEAS - MV E MAX VEL: 55.3 CM/SEC
BH CV ECHO MEAS - MV E/A: 0.85
BH CV ECHO MEAS - PA ACC TIME: 0.11 SEC
BH CV ECHO MEAS - PA PR(ACCEL): 30 MMHG
BH CV ECHO MEAS - RAP SYSTOLE: 10 MMHG
BH CV ECHO MEAS - RVSP: 22.7 MMHG
BH CV ECHO MEAS - SI(AO): 78.8 ML/M^2
BH CV ECHO MEAS - SI(CUBED): 22.5 ML/M^2
BH CV ECHO MEAS - SI(MOD-SP4): 17.8 ML/M^2
BH CV ECHO MEAS - SI(TEICH): 22.4 ML/M^2
BH CV ECHO MEAS - SV(AO): 138.9 ML
BH CV ECHO MEAS - SV(CUBED): 39.7 ML
BH CV ECHO MEAS - SV(MOD-SP4): 31.3 ML
BH CV ECHO MEAS - SV(TEICH): 39.5 ML
BH CV ECHO MEAS - TR MAX VEL: 178 CM/SEC
FLUAV SUBTYP SPEC NAA+PROBE: NOT DETECTED
FLUBV RNA ISLT QL NAA+PROBE: NOT DETECTED
SARS-COV-2 RNA PNL SPEC NAA+PROBE: NOT DETECTED

## 2022-02-25 PROCEDURE — 93306 TTE W/DOPPLER COMPLETE: CPT

## 2022-02-25 PROCEDURE — 87636 SARSCOV2 & INF A&B AMP PRB: CPT | Performed by: FAMILY MEDICINE

## 2022-02-25 PROCEDURE — C9803 HOPD COVID-19 SPEC COLLECT: HCPCS

## 2022-02-25 NOTE — PROCEDURES
Patient was unable to complete PFT.  Once test was started patient began to cough and almost passed out.  PFT test was stopped and test was canceled.  Notified JENNA Andrews, of test not performed and canceled due to patients condition.

## 2022-03-09 ENCOUNTER — TELEPHONE (OUTPATIENT)
Dept: PULMONOLOGY | Facility: CLINIC | Age: 47
End: 2022-03-09

## 2022-03-09 DIAGNOSIS — J44.9 ASTHMATIC BRONCHITIS , CHRONIC: Primary | ICD-10-CM

## 2022-03-09 RX ORDER — FLUTICASONE PROPIONATE AND SALMETEROL XINAFOATE 115; 21 UG/1; UG/1
2 AEROSOL, METERED RESPIRATORY (INHALATION)
Qty: 1 EACH | Refills: 11 | Status: SHIPPED | OUTPATIENT
Start: 2022-03-09 | End: 2022-05-03

## 2022-03-09 RX ORDER — BENZONATATE 100 MG/1
100 CAPSULE ORAL 3 TIMES DAILY PRN
Qty: 42 CAPSULE | Refills: 1 | Status: SHIPPED | OUTPATIENT
Start: 2022-03-09 | End: 2022-05-03

## 2022-03-09 NOTE — TELEPHONE ENCOUNTER
Patient returned the call.    She has noticed some benefit with use of the Tessalon Perles and the moderate dose Flovent.  Again, discussed that symptoms appear to be more seasonal and likely suggestive of asthmatic bronchitis.    · Increased therapy to Advair HFA moderate dose  · Discontinuing moderate dose Flovent  · Reordered Tessalon Perles as this has reduced coughing.  We will aim to avoid long-term usage, using additionally until inhaler therapy adjusted appropriately.

## 2022-03-09 NOTE — TELEPHONE ENCOUNTER
Called to discuss echocardiogram results.  Left a message and callback number.      · Unable to complete PFT due to near syncope.  We will assess for possible asthma based on response to steroid inhaler if she was able to start this.  · We will consider repeat CBC with differential at the next visit due to previously elevated eosinophil level.    · Echocardiogram reviewed.  Notable for diastolic dysfunction.  Normal EF, no significant valvular abnormality.  No pulmonary hypertension.  Saline test negative for PFO or ASD.  No effusion.  Continue to follow with cardiology.

## 2022-05-03 ENCOUNTER — OFFICE VISIT (OUTPATIENT)
Dept: PULMONOLOGY | Facility: CLINIC | Age: 47
End: 2022-05-03

## 2022-05-03 VITALS
HEIGHT: 61 IN | DIASTOLIC BLOOD PRESSURE: 78 MMHG | SYSTOLIC BLOOD PRESSURE: 115 MMHG | OXYGEN SATURATION: 97 % | BODY MASS INDEX: 32.1 KG/M2 | HEART RATE: 107 BPM | WEIGHT: 170 LBS | TEMPERATURE: 97.7 F

## 2022-05-03 DIAGNOSIS — J30.2 SEASONAL ALLERGIC RHINITIS, UNSPECIFIED TRIGGER: ICD-10-CM

## 2022-05-03 DIAGNOSIS — G47.33 OSA (OBSTRUCTIVE SLEEP APNEA): ICD-10-CM

## 2022-05-03 DIAGNOSIS — J44.9 ASTHMATIC BRONCHITIS , CHRONIC: Primary | ICD-10-CM

## 2022-05-03 PROBLEM — J44.89 ASTHMATIC BRONCHITIS , CHRONIC: Status: ACTIVE | Noted: 2022-05-03

## 2022-05-03 PROCEDURE — 99213 OFFICE O/P EST LOW 20 MIN: CPT | Performed by: PHYSICIAN ASSISTANT

## 2022-05-03 RX ORDER — FLUTICASONE PROPIONATE AND SALMETEROL XINAFOATE 230; 21 UG/1; UG/1
2 AEROSOL, METERED RESPIRATORY (INHALATION)
Qty: 12 G | Refills: 11 | Status: SHIPPED | OUTPATIENT
Start: 2022-05-03 | End: 2022-08-15 | Stop reason: SDUPTHER

## 2022-05-03 RX ORDER — MONTELUKAST SODIUM 10 MG/1
10 TABLET ORAL NIGHTLY
COMMUNITY
End: 2022-08-15

## 2022-05-03 RX ORDER — FLUTICASONE PROPIONATE 50 MCG
2 SPRAY, SUSPENSION (ML) NASAL DAILY
COMMUNITY

## 2022-05-03 NOTE — PROGRESS NOTES
"Chief Complaint  Cough    Subjective          Carlota Jose presents to [facility]ULMON[facility] & CRITICAL CARE MEDICINE  History of Present Illness     Patient presents today for follow-up of asthmatic bronchitis.  PFT was ordered after the previous visit, but was unable to complete due to near syncopal episode.  This has been noted in the past with as her significant exertional activities.  She often noting frequent coughing spells as well.  She noticed some benefit with use of the inhaler.  After progression to moderate dose Advair, she has noted significant benefit.  She did not require use of the Tessalon Perles.  She does require use of Flonase, Claritin, Singulair for allergic rhinitis symptoms.  It is notable that she was previously diagnosed with sleep apnea in the past.  Although she never received the machine, she noted less symptoms after significant weight loss.    Objective   Vital Signs:   /78   Pulse 107   Temp 97.7 °F (36.5 °C) (Temporal)   Ht 154.9 cm (61\")   Wt 77.1 kg (170 lb)   SpO2 97%   BMI 32.12 kg/m²     Physical Exam  Vitals reviewed.   Constitutional:       General: She is not in acute distress.     Appearance: She is well-developed. She is not diaphoretic.   HENT:      Head: Normocephalic and atraumatic.   Cardiovascular:      Rate and Rhythm: Normal rate and regular rhythm.      Heart sounds: Normal heart sounds, S1 normal and S2 normal.   Pulmonary:      Effort: Pulmonary effort is normal.      Breath sounds: No wheezing, rhonchi or rales.   Neurological:      Mental Status: She is alert and oriented to person, place, and time.   Psychiatric:         Behavior: Behavior normal.        Result Review :       Reviewed previous labs including CBC differential.  Notable for previous elevated eosinophil count.     Reviewed the CT chest from January 2022.   No abnormal pulmonary findings.    Reviewed previous home sleep study from 2018.    Unable to complete PFT due to " near syncope episode.    Reviewed echocardiogram from 2022.        Assessment and Plan    Diagnoses and all orders for this visit:    1. Asthmatic bronchitis , chronic (HCC) (Primary)  -     fluticasone-salmeterol (Advair HFA) 230-21 MCG/ACT inhaler; Inhale 2 puffs 2 (Two) Times a Day.  Dispense: 12 g; Refill: 11    2. Seasonal allergic rhinitis, unspecified trigger    3. DARREN (obstructive sleep apnea)          Asthmatic bronchitis, allergic rhinitis:   Patient was unable to complete PFT, past responded well to therapies, noted improved symptomatic stability.  · Continue albuterol inhaler as needed   · Continue ICS/LABA combination.  Increased to the high-dose Advair HFA  · On singular tablets.    Due to blackbox warning and ongoing neurological symptoms, discussed that she may benefit from discontinuation (advair dose increased to compensate for this change)  · Continue oral antihistamine, flonase as needed.       If symptoms worsen MN maintenance controlled with inhaler therapy, we will consider biologic therapy after lab assessment, she was notable for elevated eosinophil count in the past.       Obstructive sleep apnea:  did not receive her machine after previous sleep study revealed mild DARREN.  She had lost weight after the diagnosis, and may have resulted in symptom resolution.   · No concerning symptoms at this time.         Follow Up   Return in about 4 months (around 9/3/2022), or if symptoms worsen or fail to improve, for Next scheduled follow up.  Patient was given instructions and counseling regarding her condition or for health maintenance advice. Please see specific information pulled into the AVS if appropriate.

## 2022-08-15 ENCOUNTER — OFFICE VISIT (OUTPATIENT)
Dept: PULMONOLOGY | Facility: CLINIC | Age: 47
End: 2022-08-15

## 2022-08-15 DIAGNOSIS — J44.9 ASTHMATIC BRONCHITIS , CHRONIC: ICD-10-CM

## 2022-08-15 DIAGNOSIS — J30.2 SEASONAL ALLERGIC RHINITIS, UNSPECIFIED TRIGGER: Primary | ICD-10-CM

## 2022-08-15 PROCEDURE — 99441 PR PHYS/QHP TELEPHONE EVALUATION 5-10 MIN: CPT | Performed by: PHYSICIAN ASSISTANT

## 2022-08-15 RX ORDER — ALBUTEROL SULFATE 90 UG/1
2 AEROSOL, METERED RESPIRATORY (INHALATION) EVERY 4 HOURS PRN
Qty: 18 G | Refills: 11 | Status: SHIPPED | OUTPATIENT
Start: 2022-08-15

## 2022-08-15 RX ORDER — DEXAMETHASONE 4 MG/1
2 TABLET ORAL 2 TIMES DAILY
COMMUNITY
Start: 2022-05-11 | End: 2022-08-15

## 2022-08-15 RX ORDER — FLUTICASONE PROPIONATE AND SALMETEROL XINAFOATE 230; 21 UG/1; UG/1
2 AEROSOL, METERED RESPIRATORY (INHALATION)
Qty: 12 G | Refills: 11 | Status: SHIPPED | OUTPATIENT
Start: 2022-08-15

## 2022-08-15 RX ORDER — UBROGEPANT 100 MG/1
TABLET ORAL
COMMUNITY
Start: 2022-08-02

## 2022-08-15 NOTE — PROGRESS NOTES
"You have chosen to receive care through a telephone visit. Do you consent to use a telephone visit for your medical care today? Yes      Chief Complaint  Asthmatic bronchitis , chronic (HCC)    Subjective        Carlota Jose presents to [facility] PULMONARY & CRITICAL CARE MEDICINE  History of Present Illness     Follow-up visit today completed via telephone for asthmatic bronchitis, allergic rhinitis.  Doing really well with current therapy of high-dose Advair.  Since stopping Singulair after discussing the.  Blackbox warning, she has also noticed improvement in headaches as well.  She had also recently change medications as well with neurology.  No concern for recurrence of sleep apnea symptoms at this time.    Objective   Vital Signs:  There were no vitals taken for this visit.  Estimated body mass index is 32.12 kg/m² as calculated from the following:    Height as of 5/3/22: 154.9 cm (61\").    Weight as of 5/3/22: 77.1 kg (170 lb).      Physical Exam   Not completed due to telephone encounter.    Result Review :  The following data was reviewed by: Asha Andrews PA-C on 08/15/2022:        Assessment and Plan   Diagnoses and all orders for this visit:    1. Seasonal allergic rhinitis, unspecified trigger (Primary)    2. Asthmatic bronchitis , chronic (HCC)  -     fluticasone-salmeterol (Advair HFA) 230-21 MCG/ACT inhaler; Inhale 2 puffs 2 (Two) Times a Day.  Dispense: 12 g; Refill: 11  -     albuterol sulfate  (90 Base) MCG/ACT inhaler; Inhale 2 puffs Every 4 (Four) Hours As Needed for Wheezing.  Dispense: 18 g; Refill: 11          Asthmatic bronchitis, allergic rhinitis:   Patient was unable to complete PFT, past responded well to therapies, noted improved symptomatic stability.  · Continue albuterol inhaler as needed   · Continue ICS/LABA combination.  Increased to the high-dose Advair HFA  · Had recently discontinued singulair - neuro issues improved.   · Continue oral antihistamine, " flonase as needed.        Obstructive sleep apnea:  did not receive her machine after previous sleep study revealed mild DARREN.  She had lost weight after the diagnosis, and may have resulted in symptom resolution.   · Again, no concerning symptoms at this time.         Follow Up   Return in about 6 months (around 2/15/2023), or if symptoms worsen or fail to improve, for Next scheduled follow up.  Patient was given instructions and counseling regarding her condition or for health maintenance advice. Please see specific information pulled into the AVS if appropriate.       This visit has been rescheduled as a phone visit to comply with patient safety concerns in accordance with CDC recommendations. Total time of discussion was 7 minutes.

## 2022-10-13 ENCOUNTER — OFFICE VISIT (OUTPATIENT)
Dept: CARDIOLOGY | Facility: CLINIC | Age: 47
End: 2022-10-13

## 2022-10-13 VITALS
HEIGHT: 61 IN | BODY MASS INDEX: 30.58 KG/M2 | DIASTOLIC BLOOD PRESSURE: 69 MMHG | OXYGEN SATURATION: 97 % | SYSTOLIC BLOOD PRESSURE: 104 MMHG | HEART RATE: 87 BPM | WEIGHT: 162 LBS

## 2022-10-13 DIAGNOSIS — Z87.898 HISTORY OF SYNCOPE: Primary | ICD-10-CM

## 2022-10-13 PROCEDURE — 99213 OFFICE O/P EST LOW 20 MIN: CPT | Performed by: INTERNAL MEDICINE

## 2022-10-13 PROCEDURE — 93000 ELECTROCARDIOGRAM COMPLETE: CPT | Performed by: INTERNAL MEDICINE

## 2022-10-13 NOTE — PROGRESS NOTES
Kitty Jennings PA-C  Carlota Jose  1975  10/13/2022    Patient Active Problem List   Diagnosis   • DARREN (obstructive sleep apnea)   • Shortness of breath   • Precordial pain   • Syncope with no recent recurrence.   • Family history of premature coronary artery disease   • Palpitations   • Dizziness   • Sinus tachycardia seen on cardiac monitor   • Budd-Chiari syndrome (HCC)   • Recurrent cough   • Seasonal allergic rhinitis   • Asthmatic bronchitis , chronic (HCC)       Dear Kitty Saha:    Subjective     Carlota Jose is a 46 y.o. female with the problems as listed above, presents    Chief complaint: Follow-up of history of syncope.    History of Present Illness: Ms. Jose is a pleasant 46-year-old  female with history of recurrent syncope in the past.  She is here for regular follow-up.  On today's visit she denies any further episodes of syncope in a long time.  She in fact has gone up hiking a couple of miles uphill recently without any problems.      Allergies   Allergen Reactions   • Hydrocodone Shortness Of Breath and Itching     Exact reaction unknown       Current Outpatient Medications:   •  albuterol sulfate  (90 Base) MCG/ACT inhaler, Inhale 2 puffs Every 4 (Four) Hours As Needed for Wheezing., Disp: 18 g, Rfl: 11  •  aspirin 81 MG tablet, Take 1 tablet by mouth Daily., Disp: 30 tablet, Rfl: 1  •  fluticasone (FLONASE) 50 MCG/ACT nasal spray, 2 sprays into the nostril(s) as directed by provider Daily., Disp: , Rfl:   •  fluticasone-salmeterol (Advair HFA) 230-21 MCG/ACT inhaler, Inhale 2 puffs 2 (Two) Times a Day., Disp: 12 g, Rfl: 11  •  lansoprazole (PREVACID) 30 MG capsule, 30 mg Daily., Disp: , Rfl:   •  loratadine (CLARITIN) 10 MG tablet, Take 10 mg by mouth Daily., Disp: , Rfl:   •  metoprolol tartrate (LOPRESSOR) 25 MG tablet, Take 1 tablet by mouth 2 (Two) Times a Day. 1.5 tablets twice daily, Disp: 60 tablet, Rfl: 4  •  ubrogepant 100 MG tablet, TAKE ONE TABLET BY MOUTH AT  "ONSET OF MIGRAINE. IF NO RELIEF, CAN REPEAT ONE TABLET IN 2 HOURS. NO MORE THAN 2 TABLETS IN 24 HOURS., Disp: , Rfl:       The following portions of the patient's history were reviewed and updated as appropriate: allergies, current medications, past family history, past medical history, past social history, past surgical history and problem list.    Social History     Tobacco Use   • Smoking status: Never   • Smokeless tobacco: Never   Vaping Use   • Vaping Use: Never used   Substance Use Topics   • Alcohol use: No   • Drug use: No       Review of Systems   Constitutional: Negative for chills and fever.   HENT: Negative for nosebleeds and sore throat.    Cardiovascular: Negative for palpitations.   Respiratory: Negative for cough, hemoptysis and wheezing.    Gastrointestinal: Negative for abdominal pain, hematemesis, hematochezia, melena, nausea and vomiting.   Genitourinary: Negative for dysuria and hematuria.   Neurological: Negative for headaches.     Objective   Vitals:    10/13/22 1130   BP: 104/69   BP Location: Left arm   Patient Position: Sitting   Cuff Size: Adult   Pulse: 87   SpO2: 97%   Weight: 73.5 kg (162 lb)   Height: 154.9 cm (61\")     Body mass index is 30.61 kg/m².    Constitutional:       Appearance: Well-developed.   Eyes:      Conjunctiva/sclera: Conjunctivae normal.   HENT:      Head: Normocephalic.   Neck:      Thyroid: No thyromegaly.      Vascular: No JVD.      Trachea: No tracheal deviation.   Pulmonary:      Effort: No respiratory distress.      Breath sounds: Normal breath sounds. No wheezing. No rales.   Cardiovascular:      PMI at left midclavicular line. Normal rate. Regular rhythm. Normal S1. Normal S2.      Murmurs: There is no murmur.      No gallop. No click. No rub.   Pulses:     Intact distal pulses.   Edema:     Peripheral edema absent.   Abdominal:      General: Bowel sounds are normal.      Palpations: Abdomen is soft. There is no abdominal mass.      Tenderness: There is no " abdominal tenderness.   Musculoskeletal:      Cervical back: Normal range of motion and neck supple. Skin:     General: Skin is warm and dry.   Neurological:      Mental Status: Alert and oriented to person, place, and time.      Cranial Nerves: No cranial nerve deficit.         Lab Results   Component Value Date     01/19/2022    K 4.1 01/19/2022     01/19/2022    CO2 23.2 01/19/2022    BUN 9 01/19/2022    CREATININE 0.84 01/19/2022    GLUCOSE 97 01/19/2022    CALCIUM 9.0 01/19/2022    AST 15 01/19/2022    ALT 15 01/19/2022    ALKPHOS 91 01/19/2022     No results found for: CKTOTAL  Lab Results   Component Value Date    WBC 9.09 01/19/2022    HGB 13.4 01/19/2022    HCT 42.9 01/19/2022     01/19/2022     Lab Results   Component Value Date    INR 0.92 01/19/2022     No results found for: MG  Lab Results   Component Value Date    TSH 3.491 02/04/2019          ECG 12 Lead    Date/Time: 10/13/2022 11:26 AM  Performed by: Chad Dominguez MD  Authorized by: Chad Dominguez MD   Comparison: compared with previous ECG from 1/19/2022  Similar to previous ECG  Rhythm: sinus rhythm  Conduction: conduction normal  ST Segments: ST segments normal  T Waves: T waves normal    Clinical impression: normal ECG                Assessment & Plan :   Diagnosis Plan    History of syncope with no recurrence          Recommendations:  Continue with metoprolol at current dose for now.    Return in about 6 months (around 4/13/2023).    As always, Kitty Jennings PA-C  I appreciate very much the opportunity to participate in the cardiovascular care of your patients. Please do not hesitate to call me with any questions with regards to Carlota IRVIN Jose's evaluation and management.       With Best Regards,        Chad Dominguez MD, [facility]    Please note that portions of this note were completed with a voice recognition program.

## 2023-03-02 ENCOUNTER — TRANSCRIBE ORDERS (OUTPATIENT)
Dept: ADMINISTRATIVE | Facility: HOSPITAL | Age: 48
End: 2023-03-02
Payer: COMMERCIAL

## 2023-03-02 DIAGNOSIS — Z12.31 SCREENING MAMMOGRAM FOR BREAST CANCER: Primary | ICD-10-CM

## 2023-03-02 DIAGNOSIS — M25.561 RIGHT KNEE PAIN, UNSPECIFIED CHRONICITY: ICD-10-CM

## 2023-04-04 ENCOUNTER — HOSPITAL ENCOUNTER (OUTPATIENT)
Dept: MAMMOGRAPHY | Facility: HOSPITAL | Age: 48
Discharge: HOME | End: 2023-04-04
Admitting: PHYSICIAN ASSISTANT
Payer: COMMERCIAL

## 2023-04-04 ENCOUNTER — OFFICE VISIT (OUTPATIENT)
Dept: CARDIOLOGY | Facility: CLINIC | Age: 48
End: 2023-04-04
Payer: COMMERCIAL

## 2023-04-04 VITALS
BODY MASS INDEX: 30.36 KG/M2 | OXYGEN SATURATION: 97 % | WEIGHT: 160.8 LBS | DIASTOLIC BLOOD PRESSURE: 75 MMHG | HEIGHT: 61 IN | SYSTOLIC BLOOD PRESSURE: 105 MMHG | HEART RATE: 93 BPM

## 2023-04-04 DIAGNOSIS — Z87.898 HISTORY OF SYNCOPE: Primary | ICD-10-CM

## 2023-04-04 DIAGNOSIS — Z12.31 SCREENING MAMMOGRAM FOR BREAST CANCER: ICD-10-CM

## 2023-04-04 PROCEDURE — 77063 BREAST TOMOSYNTHESIS BI: CPT | Performed by: RADIOLOGY

## 2023-04-04 PROCEDURE — 77063 BREAST TOMOSYNTHESIS BI: CPT

## 2023-04-04 PROCEDURE — 77067 SCR MAMMO BI INCL CAD: CPT

## 2023-04-04 PROCEDURE — 99213 OFFICE O/P EST LOW 20 MIN: CPT | Performed by: INTERNAL MEDICINE

## 2023-04-04 PROCEDURE — 77067 SCR MAMMO BI INCL CAD: CPT | Performed by: RADIOLOGY

## 2023-04-04 NOTE — LETTER
April 4, 2023     Kitty Jennings PA-C  [address] [facility] [place] [address]    Patient: Carlota Jose   YOB: 1975   Date of Visit: 4/4/2023       Dear Kitty Saha:    Thank you for referring Carlota Jose to me for evaluation. Below are the relevant portions of my assessment and plan of care.    If you have questions, please do not hesitate to call me. I look forward to following Carlota along with you.         Sincerely,        Chad Dominguez MD        CC: No Recipients    Chad Dominguez MD  04/04/23 1631  Sign when Signing Visit  Kitty Jennings PA  Carlota Ruvalcabat  1975 04/04/2023    Patient Active Problem List   Diagnosis   • DARREN (obstructive sleep apnea)   • Shortness of breath   • Precordial pain   • Syncope with no recent recurrence.   • Family history of premature coronary artery disease   • Palpitations   • Dizziness   • Sinus tachycardia seen on cardiac monitor   • Budd-Chiari syndrome   • Recurrent cough   • Seasonal allergic rhinitis   • Asthmatic bronchitis , chronic       Dear Kitty Saha:    Subjective      Carlota Jose is a 47 y.o. female with the problems as listed above, presents    Chief complaint: Follow-up of history of syncope.    History of Present Illness:  is a pleasant 47-year-old  female with history of syncope in the past.  She is here for regular cardiology follow-up.  On today's visit she denies any further episodes of syncope.  She has occasional dizziness when she stands up suddenly.    Allergies   Allergen Reactions   • Hydrocodone Shortness Of Breath and Itching     Exact reaction unknown   :      Current Outpatient Medications:   •  albuterol sulfate  (90 Base) MCG/ACT inhaler, Inhale 2 puffs Every 4 (Four) Hours As Needed for Wheezing., Disp: 18 g, Rfl: 11  •  aspirin 81 MG tablet, Take 1 tablet by mouth Daily., Disp: 30 tablet, Rfl: 1  •  fluticasone (FLONASE) 50 MCG/ACT nasal spray, 2 sprays into the nostril(s) as directed by  "provider Daily., Disp: , Rfl:   •  fluticasone-salmeterol (Advair HFA) 230-21 MCG/ACT inhaler, Inhale 2 puffs 2 (Two) Times a Day., Disp: 12 g, Rfl: 11  •  lansoprazole (PREVACID) 30 MG capsule, 1 capsule Daily., Disp: , Rfl:   •  loratadine (CLARITIN) 10 MG tablet, Take 1 tablet by mouth Daily., Disp: , Rfl:   •  metoprolol tartrate (LOPRESSOR) 25 MG tablet, TAKE 1 & 1/2 (ONE & ONE-HALF) TABLETS BY MOUTH TWICE DAILY, Disp: 60 tablet, Rfl: 2  •  ubrogepant 100 MG tablet, TAKE ONE TABLET BY MOUTH AT ONSET OF MIGRAINE. IF NO RELIEF, CAN REPEAT ONE TABLET IN 2 HOURS. NO MORE THAN 2 TABLETS IN 24 HOURS., Disp: , Rfl:       The following portions of the patient's history were reviewed and updated as appropriate: allergies, current medications, past family history, past medical history, past social history, past surgical history and problem list.    Social History     Tobacco Use   • Smoking status: Never   • Smokeless tobacco: Never   Vaping Use   • Vaping Use: Never used   Substance Use Topics   • Alcohol use: No   • Drug use: No     Review of Systems   Constitutional: Negative for chills and fever.   HENT: Negative for nosebleeds and sore throat.    Respiratory: Negative for cough, hemoptysis and wheezing.    Gastrointestinal: Negative for abdominal pain, hematemesis, hematochezia, melena, nausea and vomiting.   Genitourinary: Negative for dysuria and hematuria.   Neurological: Negative for headaches.     Objective    Vitals:    04/04/23 1148   BP: 105/75   BP Location: Left arm   Patient Position: Sitting   Cuff Size: Adult   Pulse: 93   SpO2: 97%   Weight: 72.9 kg (160 lb 12.8 oz)   Height: 154.9 cm (61\")     Body mass index is 30.38 kg/m².    Vitals reviewed.   Constitutional:       Appearance: Well-developed.   Eyes:      Conjunctiva/sclera: Conjunctivae normal.   HENT:      Head: Normocephalic.   Neck:      Thyroid: No thyromegaly.      Vascular: No JVD.      Trachea: No tracheal deviation.   Pulmonary:      " Effort: No respiratory distress.      Breath sounds: Normal breath sounds. No wheezing. No rales.   Cardiovascular:      PMI at left midclavicular line. Normal rate. Regular rhythm. Normal S1. Normal S2.      Murmurs: There is no murmur.      No gallop. No click. No rub.   Pulses:     Intact distal pulses.   Edema:     Peripheral edema absent.   Abdominal:      General: Bowel sounds are normal.      Palpations: Abdomen is soft. There is no abdominal mass.      Tenderness: There is no abdominal tenderness.   Musculoskeletal:      Cervical back: Normal range of motion and neck supple. Skin:     General: Skin is warm and dry.   Neurological:      Mental Status: Alert and oriented to person, place, and time.      Cranial Nerves: No cranial nerve deficit.       Lab Results   Component Value Date     01/19/2022    K 4.1 01/19/2022     01/19/2022    CO2 23.2 01/19/2022    BUN 9 01/19/2022    CREATININE 0.84 01/19/2022    GLUCOSE 97 01/19/2022    CALCIUM 9.0 01/19/2022    AST 15 01/19/2022    ALT 15 01/19/2022    ALKPHOS 91 01/19/2022     No results found for: CKTOTAL  Lab Results   Component Value Date    WBC 9.09 01/19/2022    HGB 13.4 01/19/2022    HCT 42.9 01/19/2022     01/19/2022     Lab Results   Component Value Date    INR 0.92 01/19/2022        Assessment & Plan :   Diagnosis Plan    History of syncope with no recurrence          Recommendations:  Since her blood pressure is running low at the baseline, I have asked her to cut back on the dose of metoprolol to 1 tablet (25 mg) daily.    Return in about 6 months (around 10/4/2023).    As always, Kitty Saha  I appreciate very much the opportunity to participate in the cardiovascular care of your patients. Please do not hesitate to call me with any questions with regards to Carlota Jose's evaluation and management.       With Best Regards,        Chad Dominguez MD, [facility]    Please note that portions of this note were completed with a voice  recognition program.

## 2023-04-04 NOTE — PROGRESS NOTES
Kitty Jennings PA  Carlota Jose  1975 04/04/2023    Patient Active Problem List   Diagnosis   • DARREN (obstructive sleep apnea)   • Shortness of breath   • Precordial pain   • Syncope with no recent recurrence.   • Family history of premature coronary artery disease   • Palpitations   • Dizziness   • Sinus tachycardia seen on cardiac monitor   • Budd-Chiari syndrome   • Recurrent cough   • Seasonal allergic rhinitis   • Asthmatic bronchitis , chronic       Dear Kitty Jennings PA:    Subjective     Carlota Jose is a 47 y.o. female with the problems as listed above, presents    Chief complaint: Follow-up of history of syncope.    History of Present Illness:  is a pleasant 47-year-old  female with history of syncope in the past.  She is here for regular cardiology follow-up.  On today's visit she denies any further episodes of syncope.  She has occasional dizziness when she stands up suddenly.    Allergies   Allergen Reactions   • Hydrocodone Shortness Of Breath and Itching     Exact reaction unknown   :      Current Outpatient Medications:   •  albuterol sulfate  (90 Base) MCG/ACT inhaler, Inhale 2 puffs Every 4 (Four) Hours As Needed for Wheezing., Disp: 18 g, Rfl: 11  •  aspirin 81 MG tablet, Take 1 tablet by mouth Daily., Disp: 30 tablet, Rfl: 1  •  fluticasone (FLONASE) 50 MCG/ACT nasal spray, 2 sprays into the nostril(s) as directed by provider Daily., Disp: , Rfl:   •  fluticasone-salmeterol (Advair HFA) 230-21 MCG/ACT inhaler, Inhale 2 puffs 2 (Two) Times a Day., Disp: 12 g, Rfl: 11  •  lansoprazole (PREVACID) 30 MG capsule, 1 capsule Daily., Disp: , Rfl:   •  loratadine (CLARITIN) 10 MG tablet, Take 1 tablet by mouth Daily., Disp: , Rfl:   •  metoprolol tartrate (LOPRESSOR) 25 MG tablet, TAKE 1 & 1/2 (ONE & ONE-HALF) TABLETS BY MOUTH TWICE DAILY, Disp: 60 tablet, Rfl: 2  •  ubrogepant 100 MG tablet, TAKE ONE TABLET BY MOUTH AT ONSET OF MIGRAINE. IF NO RELIEF, CAN REPEAT ONE  "TABLET IN 2 HOURS. NO MORE THAN 2 TABLETS IN 24 HOURS., Disp: , Rfl:       The following portions of the patient's history were reviewed and updated as appropriate: allergies, current medications, past family history, past medical history, past social history, past surgical history and problem list.    Social History     Tobacco Use   • Smoking status: Never   • Smokeless tobacco: Never   Vaping Use   • Vaping Use: Never used   Substance Use Topics   • Alcohol use: No   • Drug use: No     Review of Systems   Constitutional: Negative for chills and fever.   HENT: Negative for nosebleeds and sore throat.    Respiratory: Negative for cough, hemoptysis and wheezing.    Gastrointestinal: Negative for abdominal pain, hematemesis, hematochezia, melena, nausea and vomiting.   Genitourinary: Negative for dysuria and hematuria.   Neurological: Negative for headaches.     Objective   Vitals:    04/04/23 1148   BP: 105/75   BP Location: Left arm   Patient Position: Sitting   Cuff Size: Adult   Pulse: 93   SpO2: 97%   Weight: 72.9 kg (160 lb 12.8 oz)   Height: 154.9 cm (61\")     Body mass index is 30.38 kg/m².    Vitals reviewed.   Constitutional:       Appearance: Well-developed.   Eyes:      Conjunctiva/sclera: Conjunctivae normal.   HENT:      Head: Normocephalic.   Neck:      Thyroid: No thyromegaly.      Vascular: No JVD.      Trachea: No tracheal deviation.   Pulmonary:      Effort: No respiratory distress.      Breath sounds: Normal breath sounds. No wheezing. No rales.   Cardiovascular:      PMI at left midclavicular line. Normal rate. Regular rhythm. Normal S1. Normal S2.      Murmurs: There is no murmur.      No gallop. No click. No rub.   Pulses:     Intact distal pulses.   Edema:     Peripheral edema absent.   Abdominal:      General: Bowel sounds are normal.      Palpations: Abdomen is soft. There is no abdominal mass.      Tenderness: There is no abdominal tenderness.   Musculoskeletal:      Cervical back: Normal " range of motion and neck supple. Skin:     General: Skin is warm and dry.   Neurological:      Mental Status: Alert and oriented to person, place, and time.      Cranial Nerves: No cranial nerve deficit.       Lab Results   Component Value Date     01/19/2022    K 4.1 01/19/2022     01/19/2022    CO2 23.2 01/19/2022    BUN 9 01/19/2022    CREATININE 0.84 01/19/2022    GLUCOSE 97 01/19/2022    CALCIUM 9.0 01/19/2022    AST 15 01/19/2022    ALT 15 01/19/2022    ALKPHOS 91 01/19/2022     No results found for: CKTOTAL  Lab Results   Component Value Date    WBC 9.09 01/19/2022    HGB 13.4 01/19/2022    HCT 42.9 01/19/2022     01/19/2022     Lab Results   Component Value Date    INR 0.92 01/19/2022        Assessment & Plan :   Diagnosis Plan    History of syncope with no recurrence          Recommendations:  Since her blood pressure is running low at the baseline, I have asked her to cut back on the dose of metoprolol to 1 tablet (25 mg) daily.    Return in about 6 months (around 10/4/2023).    As always, Kitty Saha  I appreciate very much the opportunity to participate in the cardiovascular care of your patients. Please do not hesitate to call me with any questions with regards to Carlota Jose's evaluation and management.       With Best Regards,        Chad Dominguez MD, [facility]    Please note that portions of this note were completed with a voice recognition program.

## 2023-10-05 ENCOUNTER — OFFICE VISIT (OUTPATIENT)
Dept: CARDIOLOGY | Facility: CLINIC | Age: 48
End: 2023-10-05
Payer: COMMERCIAL

## 2023-10-05 VITALS
BODY MASS INDEX: 31.98 KG/M2 | HEART RATE: 91 BPM | OXYGEN SATURATION: 97 % | WEIGHT: 169.4 LBS | DIASTOLIC BLOOD PRESSURE: 73 MMHG | HEIGHT: 61 IN | SYSTOLIC BLOOD PRESSURE: 109 MMHG

## 2023-10-05 DIAGNOSIS — R00.2 PALPITATIONS: ICD-10-CM

## 2023-10-05 DIAGNOSIS — R07.2 PRECORDIAL PAIN: Primary | ICD-10-CM

## 2023-10-05 PROCEDURE — 93000 ELECTROCARDIOGRAM COMPLETE: CPT | Performed by: INTERNAL MEDICINE

## 2023-10-05 PROCEDURE — 99213 OFFICE O/P EST LOW 20 MIN: CPT | Performed by: PHYSICIAN ASSISTANT

## 2023-10-05 PROCEDURE — 93000 ELECTROCARDIOGRAM COMPLETE: CPT | Performed by: PHYSICIAN ASSISTANT

## 2023-10-05 RX ORDER — DULOXETIN HYDROCHLORIDE 20 MG/1
1 CAPSULE, DELAYED RELEASE ORAL EVERY 12 HOURS SCHEDULED
COMMUNITY
Start: 2023-10-03

## 2023-10-05 NOTE — PROGRESS NOTES
Kitty Jennings PA  Carlota Jose  1975  10/05/2023    Patient Active Problem List   Diagnosis   • DARREN (obstructive sleep apnea)   • Shortness of breath   • Precordial pain   • Syncope with no recent recurrence.   • Family history of premature coronary artery disease   • Palpitations   • Dizziness   • Sinus tachycardia seen on cardiac monitor   • Budd-Chiari syndrome   • Recurrent cough   • Seasonal allergic rhinitis   • Asthmatic bronchitis , chronic       Dear Kitty Jennings PA:    Subjective     Carlota Jose is a 47 y.o. female with the problems as listed above, presents    Chief Complaint   Patient presents with   • Follow-up     ROUTINE       History of Present Illness:    Allergies   Allergen Reactions   • Hydrocodone Shortness Of Breath and Itching     Exact reaction unknown   :    Current Outpatient Medications:   •  albuterol sulfate  (90 Base) MCG/ACT inhaler, Inhale 2 puffs Every 4 (Four) Hours As Needed for Wheezing., Disp: 18 g, Rfl: 11  •  aspirin 81 MG tablet, Take 1 tablet by mouth Daily., Disp: 30 tablet, Rfl: 1  •  DULoxetine (CYMBALTA) 20 MG capsule, Take 1 capsule by mouth Every 12 (Twelve) Hours., Disp: , Rfl:   •  fluticasone (FLONASE) 50 MCG/ACT nasal spray, 2 sprays into the nostril(s) as directed by provider Daily., Disp: , Rfl:   •  fluticasone-salmeterol (Advair HFA) 230-21 MCG/ACT inhaler, Inhale 2 puffs 2 (Two) Times a Day., Disp: 12 g, Rfl: 11  •  lansoprazole (PREVACID) 30 MG capsule, 1 capsule Daily., Disp: , Rfl:   •  loratadine (CLARITIN) 10 MG tablet, Take 1 tablet by mouth Daily., Disp: , Rfl:   •  metoprolol tartrate (LOPRESSOR) 25 MG tablet, Take 1 tablet by mouth twice daily, Disp: 180 tablet, Rfl: 0  •  ubrogepant 100 MG tablet, TAKE ONE TABLET BY MOUTH AT ONSET OF MIGRAINE. IF NO RELIEF, CAN REPEAT ONE TABLET IN 2 HOURS. NO MORE THAN 2 TABLETS IN 24 HOURS., Disp: , Rfl:     The following portions of the patient's history were reviewed and updated as  "appropriate: allergies, current medications, past family history, past medical history, past social history, past surgical history and problem list.    Social History     Tobacco Use   • Smoking status: Never   • Smokeless tobacco: Never   Vaping Use   • Vaping Use: Never used   Substance Use Topics   • Alcohol use: No   • Drug use: No     ROS  Objective   Vitals:    10/05/23 1450   Weight: 76.8 kg (169 lb 6.4 oz)   Height: 154.9 cm (61\")     Body mass index is 32.01 kg/m².    Physical Exam    Lab Results   Component Value Date     01/19/2022    K 4.1 01/19/2022     01/19/2022    CO2 23.2 01/19/2022    BUN 9 01/19/2022    CREATININE 0.84 01/19/2022    GLUCOSE 97 01/19/2022    CALCIUM 9.0 01/19/2022    AST 15 01/19/2022    ALT 15 01/19/2022    ALKPHOS 91 01/19/2022     No results found for: CKTOTAL  Lab Results   Component Value Date    WBC 9.09 01/19/2022    HGB 13.4 01/19/2022    HCT 42.9 01/19/2022     01/19/2022     Lab Results   Component Value Date    INR 0.92 01/19/2022     No results found for: MG  Lab Results   Component Value Date    TSH 3.491 02/04/2019      No results found for: BNP    ECG 12 Lead    Date/Time: 10/5/2023 2:51 PM  Performed by: Chad Dominguez MD  Authorized by: Chad Dominguez MD         Assessment & Plan   No diagnosis found.  Recommendations     No follow-ups on file.    As always, Kitty Jennings PA  I appreciate very much the opportunity to participate in the cardiovascular care of your patients. Please do not hesitate to call me with any questions with regards to Carlota Jose's evaluation and management.       With Best Regards,        Chad Dominguez MD, [facility]    Please note that portions of this note were completed with a voice recognition program.          "

## 2023-10-05 NOTE — PROGRESS NOTES
Kitty Jennings PA  Carlota Jose  1975  10/05/2023    Patient Active Problem List   Diagnosis    DARREN (obstructive sleep apnea)    Shortness of breath    Precordial pain    Syncope with no recent recurrence.    Family history of premature coronary artery disease    Palpitations    Dizziness    Sinus tachycardia seen on cardiac monitor    Budd-Chiari syndrome    Recurrent cough    Seasonal allergic rhinitis    Asthmatic bronchitis , chronic       Dear Kitty Jennings PA:    Subjective     History of Present Illness:    Chief Complaint   Patient presents with    Follow-up     ROUTINE       aCrlota Jose is a pleasant 47 y.o. female with a past medical history significant for syncope with no recent recurrence, palpitations.  She comes in today for cardiology follow-up.    Kitty as a whole reports that she has been doing excellent since she was last seen she reports only 2 episodes that she can recall of chest pain she reports both of these were short-lived and resolve spontaneously without any recurrence.  Most recent episode was actually just yesterday she does report she is anxious as she is about to fly for the first time this weekend to [place] which is a 16-hour flight.    Allergies   Allergen Reactions    Hydrocodone Shortness Of Breath and Itching     Exact reaction unknown   :      Current Outpatient Medications:     albuterol sulfate  (90 Base) MCG/ACT inhaler, Inhale 2 puffs Every 4 (Four) Hours As Needed for Wheezing., Disp: 18 g, Rfl: 11    aspirin 81 MG tablet, Take 1 tablet by mouth Daily., Disp: 30 tablet, Rfl: 1    DULoxetine (CYMBALTA) 20 MG capsule, Take 1 capsule by mouth Every 12 (Twelve) Hours., Disp: , Rfl:     fluticasone (FLONASE) 50 MCG/ACT nasal spray, 2 sprays into the nostril(s) as directed by provider Daily., Disp: , Rfl:     fluticasone-salmeterol (Advair HFA) 230-21 MCG/ACT inhaler, Inhale 2 puffs 2 (Two) Times a Day., Disp: 12 g, Rfl: 11    lansoprazole (PREVACID) 30 MG  "capsule, 1 capsule Daily., Disp: , Rfl:     loratadine (CLARITIN) 10 MG tablet, Take 1 tablet by mouth Daily., Disp: , Rfl:     metoprolol tartrate (LOPRESSOR) 25 MG tablet, Take 1 tablet by mouth twice daily, Disp: 180 tablet, Rfl: 0    ubrogepant 100 MG tablet, TAKE ONE TABLET BY MOUTH AT ONSET OF MIGRAINE. IF NO RELIEF, CAN REPEAT ONE TABLET IN 2 HOURS. NO MORE THAN 2 TABLETS IN 24 HOURS., Disp: , Rfl:     The following portions of the patient's history were reviewed and updated as appropriate: allergies, current medications, past family history, past medical history, past social history, past surgical history and problem list.    Social History     Tobacco Use    Smoking status: Never    Smokeless tobacco: Never   Vaping Use    Vaping Use: Never used   Substance Use Topics    Alcohol use: No    Drug use: No         Objective   Vitals:    10/05/23 1450   BP: 109/73   Pulse: 91   SpO2: 97%   Weight: 76.8 kg (169 lb 6.4 oz)   Height: 154.9 cm (61\")     Body mass index is 32.01 kg/m².    Constitutional:       General: Not in acute distress.     Appearance: Healthy appearance. Well-developed and not in distress. Not diaphoretic.   Eyes:      Conjunctiva/sclera: Conjunctivae normal.      Pupils: Pupils are equal, round, and reactive to light.   HENT:      Head: Normocephalic and atraumatic.   Neck:      Vascular: No carotid bruit or JVD.   Pulmonary:      Effort: Pulmonary effort is normal. No respiratory distress.      Breath sounds: Normal breath sounds.   Cardiovascular:      Normal rate. Regular rhythm.   Edema:     Peripheral edema absent.   Skin:     General: Skin is cool.   Neurological:      Mental Status: Alert, oriented to person, place, and time and oriented to person, place and time.       Lab Results   Component Value Date     01/19/2022    K 4.1 01/19/2022     01/19/2022    CO2 23.2 01/19/2022    BUN 9 01/19/2022    CREATININE 0.84 01/19/2022    GLUCOSE 97 01/19/2022    CALCIUM 9.0 01/19/2022 "    AST 15 01/19/2022    ALT 15 01/19/2022    ALKPHOS 91 01/19/2022     No results found for: CKTOTAL  Lab Results   Component Value Date    WBC 9.09 01/19/2022    HGB 13.4 01/19/2022    HCT 42.9 01/19/2022     01/19/2022     Lab Results   Component Value Date    INR 0.92 01/19/2022     No results found for: MG  Lab Results   Component Value Date    TSH 3.491 02/04/2019      No results found for: BNP    During this visit the following were done:  Labs Reviewed []    Labs Ordered []    Radiology Reports Reviewed []    Radiology Ordered []    PCP Records Reviewed []    Referring Provider Records Reviewed []    ER Records Reviewed []    Hospital Records Reviewed []    History Obtained From Family []    Radiology Images Reviewed []    Other Reviewed []    Records Requested []         ECG 12 Lead    Date/Time: 10/5/2023 3:08 PM  Performed by: Jamari White PA-C  Authorized by: Jamari White PA-C   Comparison: compared with previous ECG   Similar to previous ECG  Rhythm: sinus rhythm  Conduction: conduction normal  ST Segments: ST segments normal    Clinical impression: normal ECG        Assessment & Plan    Diagnosis Plan   1. Precordial pain  Comprehensive Metabolic Panel    Lipid Panel      2. Palpitations                 Recommendations:  Syncope  No recurrence thus no further work-up indicated  Palpitations  Overall well controlled continue metoprolol  Precordial pain  No recurrence only recent episode she admits she has been anxious due to upcoming flight which will be her first ever flight.  For now we will continue current regimen.  Dyslipidemia  Will check lipid panel and cmp.     Return in about 6 months (around 4/5/2024).    As always, I appreciate very much the opportunity to participate in the cardiovascular care of your patients.      With Best Regards,    Jamari White PA-C

## 2023-11-03 ENCOUNTER — LAB (OUTPATIENT)
Dept: LAB | Facility: HOSPITAL | Age: 48
End: 2023-11-03
Payer: COMMERCIAL

## 2023-11-03 DIAGNOSIS — R07.2 PRECORDIAL PAIN: ICD-10-CM

## 2023-11-03 LAB
ALBUMIN SERPL-MCNC: 4.5 G/DL (ref 3.5–5.2)
ALBUMIN/GLOB SERPL: 1.5 G/DL
ALP SERPL-CCNC: 88 U/L (ref 39–117)
ALT SERPL W P-5'-P-CCNC: 18 U/L (ref 1–33)
ANION GAP SERPL CALCULATED.3IONS-SCNC: 7.2 MMOL/L (ref 5–15)
AST SERPL-CCNC: 17 U/L (ref 1–32)
BILIRUB SERPL-MCNC: 0.4 MG/DL (ref 0–1.2)
BUN SERPL-MCNC: 8 MG/DL (ref 6–20)
BUN/CREAT SERPL: 9 (ref 7–25)
CALCIUM SPEC-SCNC: 9.3 MG/DL (ref 8.6–10.5)
CHLORIDE SERPL-SCNC: 104 MMOL/L (ref 98–107)
CHOLEST SERPL-MCNC: 250 MG/DL (ref 0–200)
CO2 SERPL-SCNC: 27.8 MMOL/L (ref 22–29)
CREAT SERPL-MCNC: 0.89 MG/DL (ref 0.57–1)
EGFRCR SERPLBLD CKD-EPI 2021: 81.1 ML/MIN/1.73
GLOBULIN UR ELPH-MCNC: 3.1 GM/DL
GLUCOSE SERPL-MCNC: 103 MG/DL (ref 65–99)
HDLC SERPL-MCNC: 39 MG/DL (ref 40–60)
LDLC SERPL CALC-MCNC: 173 MG/DL (ref 0–100)
LDLC/HDLC SERPL: 4.36 {RATIO}
POTASSIUM SERPL-SCNC: 4.1 MMOL/L (ref 3.5–5.2)
PROT SERPL-MCNC: 7.6 G/DL (ref 6–8.5)
SODIUM SERPL-SCNC: 139 MMOL/L (ref 136–145)
TRIGL SERPL-MCNC: 205 MG/DL (ref 0–150)
VLDLC SERPL-MCNC: 38 MG/DL (ref 5–40)

## 2023-11-03 PROCEDURE — 80053 COMPREHEN METABOLIC PANEL: CPT

## 2023-11-03 PROCEDURE — 36415 COLL VENOUS BLD VENIPUNCTURE: CPT

## 2023-11-03 PROCEDURE — 80061 LIPID PANEL: CPT

## 2024-02-19 ENCOUNTER — TRANSCRIBE ORDERS (OUTPATIENT)
Dept: ADMINISTRATIVE | Facility: HOSPITAL | Age: 49
End: 2024-02-19
Payer: COMMERCIAL

## 2024-02-19 DIAGNOSIS — Z12.31 BREAST CANCER SCREENING BY MAMMOGRAM: Primary | ICD-10-CM

## 2024-04-02 ENCOUNTER — OFFICE VISIT (OUTPATIENT)
Dept: CARDIOLOGY | Facility: CLINIC | Age: 49
End: 2024-04-02
Payer: COMMERCIAL

## 2024-04-02 VITALS
HEART RATE: 88 BPM | OXYGEN SATURATION: 97 % | DIASTOLIC BLOOD PRESSURE: 66 MMHG | SYSTOLIC BLOOD PRESSURE: 98 MMHG | RESPIRATION RATE: 16 BRPM | HEIGHT: 60 IN | BODY MASS INDEX: 31.88 KG/M2 | WEIGHT: 162.4 LBS

## 2024-04-02 DIAGNOSIS — R07.2 PRECORDIAL PAIN: Primary | ICD-10-CM

## 2024-04-02 DIAGNOSIS — E78.5 DYSLIPIDEMIA: ICD-10-CM

## 2024-04-02 PROCEDURE — 93000 ELECTROCARDIOGRAM COMPLETE: CPT | Performed by: INTERNAL MEDICINE

## 2024-04-02 PROCEDURE — 99214 OFFICE O/P EST MOD 30 MIN: CPT | Performed by: INTERNAL MEDICINE

## 2024-04-02 NOTE — PROGRESS NOTES
Kitty Jennings PA  Carlota Jose  1975 04/02/2024    Patient Active Problem List   Diagnosis    DARREN (obstructive sleep apnea)    Shortness of breath    Precordial pain    Syncope with no recent recurrence.    Family history of premature coronary artery disease    Palpitations    Dizziness    Sinus tachycardia seen on cardiac monitor    Budd-Chiari syndrome    Recurrent cough    Seasonal allergic rhinitis    Asthmatic bronchitis , chronic       Dear Kitty Saha:    Subjective     Carlota Jose is a 48 y.o. female with the problems as listed above, presents    Chief Complaint   Patient presents with    Follow-up     6 mos, lab review    Med Management     verbal       History of Present Illness: Ms. Carlota Jose is a pleasant 48-year-old  female with no history of known coronary artery disease, has previous history of syncope long time ago, is being followed for the same.  On today's visit she denies any further episodes of syncope or significant dizziness.  She does complain of some intermittent left-sided chest pains that seem to occur at random with no relation to exertion and resolve spontaneously.  She has a history of dyslipidemia.  She is nondiabetic, nonhypertensive and non-smoker.  She does have some family history of coronary artery disease.  She rates the chest pains as being mild and associated with some shortness of breath.  These will last for a few minutes and resolve spontaneously.    Allergies   Allergen Reactions    Hydrocodone Shortness Of Breath and Itching     Exact reaction unknown   :    Current Outpatient Medications:     albuterol sulfate  (90 Base) MCG/ACT inhaler, Inhale 2 puffs Every 4 (Four) Hours As Needed for Wheezing., Disp: 18 g, Rfl: 11    aspirin 81 MG tablet, Take 1 tablet by mouth Daily., Disp: 30 tablet, Rfl: 1    DULoxetine (CYMBALTA) 20 MG capsule, Take 1 capsule by mouth Every 12 (Twelve) Hours., Disp: , Rfl:     lansoprazole (PREVACID) 30 MG capsule, 1  "capsule Daily., Disp: , Rfl:     loratadine (CLARITIN) 10 MG tablet, Take 1 tablet by mouth Daily., Disp: , Rfl:     metoprolol tartrate (LOPRESSOR) 25 MG tablet, Take 1 tablet by mouth twice daily, Disp: 180 tablet, Rfl: 0    ubrogepant 100 MG tablet, TAKE ONE TABLET BY MOUTH AT ONSET OF MIGRAINE. IF NO RELIEF, CAN REPEAT ONE TABLET IN 2 HOURS. NO MORE THAN 2 TABLETS IN 24 HOURS., Disp: , Rfl:     The following portions of the patient's history were reviewed and updated as appropriate: allergies, current medications, past family history, past medical history, past social history, past surgical history and problem list.    Social History     Tobacco Use    Smoking status: Never    Smokeless tobacco: Never   Vaping Use    Vaping status: Never Used   Substance Use Topics    Alcohol use: No    Drug use: No     Review of Systems   Constitutional: Negative for chills and fever.   HENT:  Negative for nosebleeds and sore throat.    Cardiovascular:  Positive for chest pain.   Respiratory:  Negative for cough, hemoptysis and wheezing.    Gastrointestinal:  Negative for abdominal pain, hematemesis, hematochezia, melena, nausea and vomiting.   Genitourinary:  Negative for dysuria and hematuria.   Neurological:  Negative for headaches.     Objective   Vitals:    04/02/24 1227   BP: 98/66   Pulse: 88   Resp: 16   SpO2: 97%   Weight: 73.7 kg (162 lb 6.4 oz)   Height: 152.4 cm (60\")     Body mass index is 31.72 kg/m².    Vitals reviewed.   Constitutional:       Appearance: Well-developed.   Eyes:      Conjunctiva/sclera: Conjunctivae normal.   HENT:      Head: Normocephalic.   Neck:      Thyroid: No thyromegaly.      Vascular: No JVD.      Trachea: No tracheal deviation.   Pulmonary:      Effort: No respiratory distress.      Breath sounds: Normal breath sounds. No wheezing. No rales.   Cardiovascular:      PMI at left midclavicular line. Normal rate. Regular rhythm. Normal S1. Normal S2.       Murmurs: There is no murmur.      No " "gallop.  No click. No rub.   Pulses:     Intact distal pulses.   Edema:     Peripheral edema absent.   Abdominal:      General: Bowel sounds are normal.      Palpations: Abdomen is soft. There is no abdominal mass.      Tenderness: There is no abdominal tenderness.   Musculoskeletal:      Cervical back: Normal range of motion and neck supple. Skin:     General: Skin is warm and dry.   Neurological:      Mental Status: Alert and oriented to person, place, and time.      Cranial Nerves: No cranial nerve deficit.         Lab Results   Component Value Date     11/03/2023    K 4.1 11/03/2023     11/03/2023    CO2 27.8 11/03/2023    BUN 8 11/03/2023    CREATININE 0.89 11/03/2023    GLUCOSE 103 (H) 11/03/2023    CALCIUM 9.3 11/03/2023    AST 17 11/03/2023    ALT 18 11/03/2023    ALKPHOS 88 11/03/2023     No results found for: \"CKTOTAL\"  Lab Results   Component Value Date    WBC 9.09 01/19/2022    HGB 13.4 01/19/2022    HCT 42.9 01/19/2022     01/19/2022     Lab Results   Component Value Date    INR 0.92 01/19/2022     No results found for: \"MG\"  Lab Results   Component Value Date    TSH 3.491 02/04/2019    TRIG 205 (H) 11/03/2023    HDL 39 (L) 11/03/2023     (H) 11/03/2023          ECG 12 Lead    Date/Time: 4/2/2024 12:27 PM  Performed by: Chad Dominguez MD    Authorized by: Chad Dominguez MD  Comparison: compared with previous ECG from 10/5/2023  Similar to previous ECG  Rhythm: sinus rhythm  Conduction: conduction normal  ST Segments: ST segments normal              Assessment & Plan    Diagnosis Plan   1. Precordial pain  Adult Stress Echo W/ Cont or Stress Agent if Necessary Per Protocol      2. Dyslipidemia          Recommendations  Will evaluate her chest pains further with a stress echo study.  I have reemphasized about low-cholesterol diet and especially to avoid red meats and processed meats and fried foods.  I have encouraged her to eat more of white meat which is baked or grilled " but not fried and vegetables.    Return in about 5 weeks (around 5/7/2024) for or sooner if needed.    As always, Isela  I appreciate very much the opportunity to participate in the cardiovascular care of your patients. Please do not hesitate to call me with any questions with regards to Carlota IRVIN Jose's evaluation and management.       With Best Regards,        Chad Dominguez MD, [facility]    Please note that portions of this note were completed with a voice recognition program.

## 2024-04-02 NOTE — LETTER
April 2, 2024     Kitty Jennings PA-C  [address] [facility] [place] [address]    Patient: Carlota Jose   YOB: 1975   Date of Visit: 4/2/2024     Dear Kitty Saha:       Thank you for referring Carlota Jose to me for evaluation. Below are the relevant portions of my assessment and plan of care.    If you have questions, please do not hesitate to call me. I look forward to following Carlota along with you.         Sincerely,        Chad Dominguez MD        CC: No Recipients    Chad Dominguez MD  04/02/24 1705  Sign when Signing Visit  Kitty Jennings PA  Carlota Ruvalcabat  1975 04/02/2024    Patient Active Problem List   Diagnosis   • DARREN (obstructive sleep apnea)   • Shortness of breath   • Precordial pain   • Syncope with no recent recurrence.   • Family history of premature coronary artery disease   • Palpitations   • Dizziness   • Sinus tachycardia seen on cardiac monitor   • Budd-Chiari syndrome   • Recurrent cough   • Seasonal allergic rhinitis   • Asthmatic bronchitis , chronic       Dear Kitty aSha:    Subjective    Carlota Jose is a 48 y.o. female with the problems as listed above, presents    Chief Complaint   Patient presents with   • Follow-up     6 mos, lab review   • Med Management     verbal       History of Present Illness: Ms. Carlota Jose is a pleasant 48-year-old  female with no history of known coronary artery disease, has previous history of syncope long time ago, is being followed for the same.  On today's visit she denies any further episodes of syncope or significant dizziness.  She does complain of some intermittent left-sided chest pains that seem to occur at random with no relation to exertion and resolve spontaneously.  She has a history of dyslipidemia.  She is nondiabetic, nonhypertensive and non-smoker.  She does have some family history of coronary artery disease.  She rates the chest pains as being mild and associated with some shortness of breath.  These  will last for a few minutes and resolve spontaneously.    Allergies   Allergen Reactions   • Hydrocodone Shortness Of Breath and Itching     Exact reaction unknown   :    Current Outpatient Medications:   •  albuterol sulfate  (90 Base) MCG/ACT inhaler, Inhale 2 puffs Every 4 (Four) Hours As Needed for Wheezing., Disp: 18 g, Rfl: 11  •  aspirin 81 MG tablet, Take 1 tablet by mouth Daily., Disp: 30 tablet, Rfl: 1  •  DULoxetine (CYMBALTA) 20 MG capsule, Take 1 capsule by mouth Every 12 (Twelve) Hours., Disp: , Rfl:   •  lansoprazole (PREVACID) 30 MG capsule, 1 capsule Daily., Disp: , Rfl:   •  loratadine (CLARITIN) 10 MG tablet, Take 1 tablet by mouth Daily., Disp: , Rfl:   •  metoprolol tartrate (LOPRESSOR) 25 MG tablet, Take 1 tablet by mouth twice daily, Disp: 180 tablet, Rfl: 0  •  ubrogepant 100 MG tablet, TAKE ONE TABLET BY MOUTH AT ONSET OF MIGRAINE. IF NO RELIEF, CAN REPEAT ONE TABLET IN 2 HOURS. NO MORE THAN 2 TABLETS IN 24 HOURS., Disp: , Rfl:     The following portions of the patient's history were reviewed and updated as appropriate: allergies, current medications, past family history, past medical history, past social history, past surgical history and problem list.    Social History     Tobacco Use   • Smoking status: Never   • Smokeless tobacco: Never   Vaping Use   • Vaping status: Never Used   Substance Use Topics   • Alcohol use: No   • Drug use: No     Review of Systems   Constitutional: Negative for chills and fever.   HENT:  Negative for nosebleeds and sore throat.    Cardiovascular:  Positive for chest pain.   Respiratory:  Negative for cough, hemoptysis and wheezing.    Gastrointestinal:  Negative for abdominal pain, hematemesis, hematochezia, melena, nausea and vomiting.   Genitourinary:  Negative for dysuria and hematuria.   Neurological:  Negative for headaches.     Objective  Vitals:    04/02/24 1227   BP: 98/66   Pulse: 88   Resp: 16   SpO2: 97%   Weight: 73.7 kg (162 lb 6.4 oz)  "  Height: 152.4 cm (60\")     Body mass index is 31.72 kg/m².    Vitals reviewed.   Constitutional:       Appearance: Well-developed.   Eyes:      Conjunctiva/sclera: Conjunctivae normal.   HENT:      Head: Normocephalic.   Neck:      Thyroid: No thyromegaly.      Vascular: No JVD.      Trachea: No tracheal deviation.   Pulmonary:      Effort: No respiratory distress.      Breath sounds: Normal breath sounds. No wheezing. No rales.   Cardiovascular:      PMI at left midclavicular line. Normal rate. Regular rhythm. Normal S1. Normal S2.       Murmurs: There is no murmur.      No gallop.  No click. No rub.   Pulses:     Intact distal pulses.   Edema:     Peripheral edema absent.   Abdominal:      General: Bowel sounds are normal.      Palpations: Abdomen is soft. There is no abdominal mass.      Tenderness: There is no abdominal tenderness.   Musculoskeletal:      Cervical back: Normal range of motion and neck supple. Skin:     General: Skin is warm and dry.   Neurological:      Mental Status: Alert and oriented to person, place, and time.      Cranial Nerves: No cranial nerve deficit.         Lab Results   Component Value Date     11/03/2023    K 4.1 11/03/2023     11/03/2023    CO2 27.8 11/03/2023    BUN 8 11/03/2023    CREATININE 0.89 11/03/2023    GLUCOSE 103 (H) 11/03/2023    CALCIUM 9.3 11/03/2023    AST 17 11/03/2023    ALT 18 11/03/2023    ALKPHOS 88 11/03/2023     No results found for: \"CKTOTAL\"  Lab Results   Component Value Date    WBC 9.09 01/19/2022    HGB 13.4 01/19/2022    HCT 42.9 01/19/2022     01/19/2022     Lab Results   Component Value Date    INR 0.92 01/19/2022     No results found for: \"MG\"  Lab Results   Component Value Date    TSH 3.491 02/04/2019    TRIG 205 (H) 11/03/2023    HDL 39 (L) 11/03/2023     (H) 11/03/2023          ECG 12 Lead    Date/Time: 4/2/2024 12:27 PM  Performed by: Chad Dominguez MD    Authorized by: Chad Dominguez MD  Comparison: compared with " previous ECG from 10/5/2023  Similar to previous ECG  Rhythm: sinus rhythm  Conduction: conduction normal  ST Segments: ST segments normal              Assessment & Plan   Diagnosis Plan   1. Precordial pain  Adult Stress Echo W/ Cont or Stress Agent if Necessary Per Protocol      2. Dyslipidemia          Recommendations  Will evaluate her chest pains further with a stress echo study.  I have reemphasized about low-cholesterol diet and especially to avoid red meats and processed meats and fried foods.  I have encouraged her to eat more of white meat which is baked or grilled but not fried and vegetables.    Return in about 5 weeks (around 5/7/2024) for or sooner if needed.    As always, Kitty Saha  I appreciate very much the opportunity to participate in the cardiovascular care of your patients. Please do not hesitate to call me with any questions with regards to Carlota IRVIN Jose's evaluation and management.       With Best Regards,        Chad Dominguez MD, [facility]    Please note that portions of this note were completed with a voice recognition program.

## 2024-04-05 ENCOUNTER — HOSPITAL ENCOUNTER (OUTPATIENT)
Dept: MAMMOGRAPHY | Facility: HOSPITAL | Age: 49
Discharge: HOME | End: 2024-04-05
Admitting: PHYSICIAN ASSISTANT
Payer: COMMERCIAL

## 2024-04-05 DIAGNOSIS — Z12.31 BREAST CANCER SCREENING BY MAMMOGRAM: ICD-10-CM

## 2024-04-05 PROCEDURE — 77063 BREAST TOMOSYNTHESIS BI: CPT

## 2024-04-05 PROCEDURE — 77067 SCR MAMMO BI INCL CAD: CPT

## 2024-04-30 ENCOUNTER — HOSPITAL ENCOUNTER (OUTPATIENT)
Dept: CARDIOLOGY | Facility: HOSPITAL | Age: 49
Discharge: HOME | End: 2024-04-30
Payer: COMMERCIAL

## 2024-04-30 DIAGNOSIS — R07.2 PRECORDIAL PAIN: ICD-10-CM

## 2024-04-30 PROCEDURE — 93351 STRESS TTE COMPLETE: CPT

## 2024-04-30 PROCEDURE — 93325 DOPPLER ECHO COLOR FLOW MAPG: CPT

## 2024-04-30 PROCEDURE — 93320 DOPPLER ECHO COMPLETE: CPT

## 2024-05-03 LAB
BH CV ECHO MEAS - ACS: 1.4 CM
BH CV ECHO MEAS - AO MAX PG: 5.7 MMHG
BH CV ECHO MEAS - AO MEAN PG: 3 MMHG
BH CV ECHO MEAS - AO ROOT DIAM: 2.7 CM
BH CV ECHO MEAS - AO V2 MAX: 119 CM/SEC
BH CV ECHO MEAS - AO V2 VTI: 22.4 CM
BH CV ECHO MEAS - EDV(CUBED): 103.8 ML
BH CV ECHO MEAS - EDV(MOD-SP4): 53.2 ML
BH CV ECHO MEAS - EF(MOD-SP4): 51.7 %
BH CV ECHO MEAS - ESV(CUBED): 32.8 ML
BH CV ECHO MEAS - ESV(MOD-SP4): 25.7 ML
BH CV ECHO MEAS - FS: 31.9 %
BH CV ECHO MEAS - IVS/LVPW: 1.13 CM
BH CV ECHO MEAS - IVSD: 0.9 CM
BH CV ECHO MEAS - LA DIMENSION: 3.4 CM
BH CV ECHO MEAS - LAT PEAK E' VEL: 9.4 CM/SEC
BH CV ECHO MEAS - LV DIASTOLIC VOL/BSA (35-75): 31.2 CM2
BH CV ECHO MEAS - LV MASS(C)D: 132.3 GRAMS
BH CV ECHO MEAS - LV SYSTOLIC VOL/BSA (12-30): 15.1 CM2
BH CV ECHO MEAS - LVIDD: 4.7 CM
BH CV ECHO MEAS - LVIDS: 3.2 CM
BH CV ECHO MEAS - LVOT AREA: 3.1 CM2
BH CV ECHO MEAS - LVOT DIAM: 2 CM
BH CV ECHO MEAS - LVPWD: 0.8 CM
BH CV ECHO MEAS - MED PEAK E' VEL: 6.5 CM/SEC
BH CV ECHO MEAS - MV A MAX VEL: 63.4 CM/SEC
BH CV ECHO MEAS - MV E MAX VEL: 54 CM/SEC
BH CV ECHO MEAS - MV E/A: 0.85
BH CV ECHO MEAS - PA ACC TIME: 0.1 SEC
BH CV ECHO MEAS - RAP SYSTOLE: 10 MMHG
BH CV ECHO MEAS - RVSP: 26 MMHG
BH CV ECHO MEAS - SV(MOD-SP4): 27.5 ML
BH CV ECHO MEAS - SVI(MOD-SP4): 16.1 ML/M2
BH CV ECHO MEAS - TAPSE (>1.6): 1.57 CM
BH CV ECHO MEAS - TR MAX PG: 16 MMHG
BH CV ECHO MEAS - TR MAX VEL: 200 CM/SEC
BH CV ECHO MEASUREMENTS AVERAGE E/E' RATIO: 6.79
BH CV STRESS BP STAGE 1: NORMAL
BH CV STRESS BP STAGE 2: NORMAL
BH CV STRESS BP STAGE 3: NORMAL
BH CV STRESS DURATION MIN STAGE 1: 3
BH CV STRESS DURATION MIN STAGE 2: 3
BH CV STRESS DURATION MIN STAGE 3: 2
BH CV STRESS DURATION SEC STAGE 1: 0
BH CV STRESS DURATION SEC STAGE 2: 0
BH CV STRESS DURATION SEC STAGE 3: 0
BH CV STRESS ECHO POST STRESS EJECTION FRACTION EF: 70 %
BH CV STRESS GRADE STAGE 1: 10
BH CV STRESS GRADE STAGE 2: 12
BH CV STRESS GRADE STAGE 3: 14
BH CV STRESS HR STAGE 1: 122
BH CV STRESS HR STAGE 2: 139
BH CV STRESS HR STAGE 3: 171
BH CV STRESS METS STAGE 1: 5
BH CV STRESS METS STAGE 2: 7.5
BH CV STRESS METS STAGE 3: 10
BH CV STRESS PROTOCOL 1: NORMAL
BH CV STRESS RECOVERY BP: NORMAL MMHG
BH CV STRESS RECOVERY HR: 96 BPM
BH CV STRESS SPEED STAGE 1: 1.7
BH CV STRESS SPEED STAGE 2: 2.5
BH CV STRESS SPEED STAGE 3: 3.4
BH CV STRESS STAGE 1: 1
BH CV STRESS STAGE 2: 2
BH CV STRESS STAGE 3: 3
LEFT ATRIUM VOLUME INDEX: 14.9 ML/M2
MAXIMAL PREDICTED HEART RATE: 173 BPM
PERCENT MAX PREDICTED HR: 98.84 %
STRESS BASELINE BP: NORMAL MMHG
STRESS BASELINE HR: 89 BPM
STRESS PERCENT HR: 116 %
STRESS POST ESTIMATED WORKLOAD: 10.1 METS
STRESS POST EXERCISE DUR MIN: 8 MIN
STRESS POST PEAK BP: NORMAL MMHG
STRESS POST PEAK HR: 171 BPM
STRESS TARGET HR: 147 BPM

## 2024-05-14 ENCOUNTER — OFFICE VISIT (OUTPATIENT)
Dept: CARDIOLOGY | Facility: CLINIC | Age: 49
End: 2024-05-14
Payer: COMMERCIAL

## 2024-05-14 VITALS
DIASTOLIC BLOOD PRESSURE: 69 MMHG | BODY MASS INDEX: 32.24 KG/M2 | HEIGHT: 60 IN | SYSTOLIC BLOOD PRESSURE: 104 MMHG | WEIGHT: 164.2 LBS | HEART RATE: 92 BPM | OXYGEN SATURATION: 96 %

## 2024-05-14 DIAGNOSIS — R00.2 PALPITATIONS: ICD-10-CM

## 2024-05-14 DIAGNOSIS — R07.2 PRECORDIAL PAIN: ICD-10-CM

## 2024-05-14 DIAGNOSIS — E78.5 DYSLIPIDEMIA: Primary | ICD-10-CM

## 2024-05-14 PROCEDURE — 99213 OFFICE O/P EST LOW 20 MIN: CPT | Performed by: PHYSICIAN ASSISTANT

## 2024-05-14 RX ORDER — HYDROXYZINE PAMOATE 25 MG/1
1 CAPSULE ORAL EVERY 12 HOURS SCHEDULED
COMMUNITY
Start: 2024-05-10

## 2024-05-14 NOTE — PROGRESS NOTES
Kitty Jennings PA  Carlota Jose  1975 05/14/2024    Patient Active Problem List   Diagnosis    DARREN (obstructive sleep apnea)    Shortness of breath    Precordial pain    Syncope with no recent recurrence.    Family history of premature coronary artery disease    Palpitations    Dizziness    Sinus tachycardia seen on cardiac monitor    Budd-Chiari syndrome    Recurrent cough    Seasonal allergic rhinitis    Asthmatic bronchitis , chronic       Dear Kitty Jennings PA:    Subjective     History of Present Illness:    Chief Complaint   Patient presents with    Results     STRESS ECHO       Carlota Jose is a pleasant 48 y.o. female with a past medical history significant for dyslipidemia and diastolic dysfunction.  She denies history of tobacco abuse or diabetes mellitus.  She comes in today for cardiology follow-up.    Carlota did have stress echo performed where she was able to exercise for 8 minutes achieving 10 METS and 98% of predicted max heart rate.  Stress echo showed normal echocardiographic response to stress with no evidence of myocardial ischemia.  Nonstressed portion did show normal LVEF with grade 1 diastolic dysfunction mild mitral valve regurgitation.  Clinically she denies any shortness of breath, palpitations, or syncope.  She does still report rare episodes of sharp pain in her chest.        Allergies   Allergen Reactions    Hydrocodone Shortness Of Breath and Itching     Exact reaction unknown   :      Current Outpatient Medications:     albuterol sulfate  (90 Base) MCG/ACT inhaler, Inhale 2 puffs Every 4 (Four) Hours As Needed for Wheezing., Disp: 18 g, Rfl: 11    DULoxetine (CYMBALTA) 20 MG capsule, Take 3 capsules by mouth Every 12 (Twelve) Hours., Disp: , Rfl:     hydrOXYzine pamoate (VISTARIL) 25 MG capsule, Take 1 capsule by mouth Every 12 (Twelve) Hours., Disp: , Rfl:     lansoprazole (PREVACID) 30 MG capsule, 1 capsule Daily., Disp: , Rfl:     loratadine (CLARITIN) 10 MG  "tablet, Take 1 tablet by mouth Daily., Disp: , Rfl:     metoprolol tartrate (LOPRESSOR) 25 MG tablet, Take 1 tablet by mouth twice daily, Disp: 180 tablet, Rfl: 0    ubrogepant 100 MG tablet, TAKE ONE TABLET BY MOUTH AT ONSET OF MIGRAINE. IF NO RELIEF, CAN REPEAT ONE TABLET IN 2 HOURS. NO MORE THAN 2 TABLETS IN 24 HOURS., Disp: , Rfl:     The following portions of the patient's history were reviewed and updated as appropriate: allergies, current medications, past family history, past medical history, past social history, past surgical history and problem list.    Social History     Tobacco Use    Smoking status: Never    Smokeless tobacco: Never   Vaping Use    Vaping status: Never Used   Substance Use Topics    Alcohol use: No    Drug use: No         Objective   Vitals:    05/14/24 0910   BP: 104/69   Pulse: 92   SpO2: 96%   Weight: 74.5 kg (164 lb 3.2 oz)   Height: 152.4 cm (60\")     Body mass index is 32.07 kg/m².    ROS    Constitutional:       General: Not in acute distress.     Appearance: Healthy appearance. Well-developed and not in distress. Not diaphoretic.   Eyes:      Conjunctiva/sclera: Conjunctivae normal.      Pupils: Pupils are equal, round, and reactive to light.   HENT:      Head: Normocephalic and atraumatic.   Neck:      Vascular: No carotid bruit or JVD.   Pulmonary:      Effort: Pulmonary effort is normal. No respiratory distress.      Breath sounds: Normal breath sounds.   Cardiovascular:      Normal rate. Regular rhythm.   Edema:     Peripheral edema absent.   Skin:     General: Skin is cool.   Neurological:      Mental Status: Alert, oriented to person, place, and time and oriented to person, place and time.         Lab Results   Component Value Date     11/03/2023    K 4.1 11/03/2023     11/03/2023    CO2 27.8 11/03/2023    BUN 8 11/03/2023    CREATININE 0.89 11/03/2023    GLUCOSE 103 (H) 11/03/2023    CALCIUM 9.3 11/03/2023    AST 17 11/03/2023    ALT 18 11/03/2023    ALKPHOS " "88 11/03/2023     No results found for: \"CKTOTAL\"  Lab Results   Component Value Date    WBC 9.09 01/19/2022    HGB 13.4 01/19/2022    HCT 42.9 01/19/2022     01/19/2022     Lab Results   Component Value Date    INR 0.92 01/19/2022     No results found for: \"MG\"  Lab Results   Component Value Date    TSH 3.491 02/04/2019    TRIG 205 (H) 11/03/2023    HDL 39 (L) 11/03/2023     (H) 11/03/2023      No results found for: \"BNP\"    During this visit the following were done:  Labs Reviewed []    Labs Ordered []    Radiology Reports Reviewed []    Radiology Ordered []    PCP Records Reviewed []    Referring Provider Records Reviewed []    ER Records Reviewed []    Hospital Records Reviewed []    History Obtained From Family []    Radiology Images Reviewed []    Other Reviewed []    Records Requested []       Procedures    Assessment & Plan    Diagnosis Plan   1. Dyslipidemia  Lipid Panel      2. Precordial pain        3. Palpitations                 Recommendations:  Precordial pain  Discussed results of stress echo which was unremarkable.  Symptoms atypical encouraged noncardiac workup.  Patient can stop aspirin given normal stress echo.  Dyslipidemia  Significantly elevated LDL.  She does report significant lifestyle change cutting out red meat and cheese.  Will repeat lipid panel if still significantly elevated will consider pharmacotherapy.    No follow-ups on file.    As always, I appreciate very much the opportunity to participate in the cardiovascular care of your patients.      With Best Regards,    Jamari White PA-C          "

## 2024-05-15 ENCOUNTER — LAB (OUTPATIENT)
Dept: LAB | Facility: HOSPITAL | Age: 49
End: 2024-05-15
Payer: COMMERCIAL

## 2024-05-15 DIAGNOSIS — E78.5 DYSLIPIDEMIA: ICD-10-CM

## 2024-05-15 LAB
CHOLEST SERPL-MCNC: 249 MG/DL (ref 0–200)
HDLC SERPL-MCNC: 36 MG/DL (ref 40–60)
LDLC SERPL CALC-MCNC: 189 MG/DL (ref 0–100)
LDLC/HDLC SERPL: 5.2 {RATIO}
TRIGL SERPL-MCNC: 129 MG/DL (ref 0–150)
VLDLC SERPL-MCNC: 24 MG/DL (ref 5–40)

## 2024-05-15 PROCEDURE — 80061 LIPID PANEL: CPT

## 2024-05-15 PROCEDURE — 36415 COLL VENOUS BLD VENIPUNCTURE: CPT

## 2024-05-16 DIAGNOSIS — E78.5 DYSLIPIDEMIA: Primary | ICD-10-CM

## 2024-05-31 ENCOUNTER — SPECIALTY PHARMACY (OUTPATIENT)
Dept: PHARMACY | Facility: HOSPITAL | Age: 49
End: 2024-05-31
Payer: COMMERCIAL

## 2024-05-31 ENCOUNTER — DISEASE STATE MANAGEMENT VISIT (OUTPATIENT)
Dept: PHARMACY | Facility: HOSPITAL | Age: 49
End: 2024-05-31
Payer: COMMERCIAL

## 2024-05-31 DIAGNOSIS — E78.5 DYSLIPIDEMIA: Primary | ICD-10-CM

## 2024-05-31 RX ORDER — DULOXETIN HYDROCHLORIDE 60 MG/1
1 CAPSULE, DELAYED RELEASE ORAL DAILY
COMMUNITY
Start: 2024-05-10

## 2024-05-31 NOTE — PROGRESS NOTES
Initial Education Provided for Praluent/Repatha    Patient seen in the [facility] Management Clinic for initial education and injection training for PCSK9 inhibitors. The patient was introduced to services offered by [facility], including: regular assessments, refill coordination, curbside pick-up or mail order delivery options, prior authorization maintenance, and financial assistance programs as applicable. The patient was also provided with contact information for the pharmacy team. Welcome information and patient satisfaction survey to be sent by retail team with patient's initial fill.    Patient Instructions    Repatha/Praluent is used to lower LDL, or bad cholesterol, to help reduce your chance of a heart attack or stroke.  You should give your injection once every 14 days.  Your doctor will likely keep you on this medication indefinitely as long as it is working for you and not causing any adverse effects.      This medication should be kept in the refrigerator until you are ready to use it.  Once removed from the refrigerator, it is good at room temperature for 30 days.  Do not leave in your car or expose to extreme heat.  Do not shake or freeze.  Dispose the used syringe/device in a sharps container.     This injection is a subcutaneous injection, which means just under the skin.  It is important to choose an area of your skin that is not tender, bruised, cut or has scars or stretch marks.  You can inject into your abdomen (except for 2 inches around your navel), your thigh or your upper arm.  Do not administer with other drugs.   Rotate injection sites each time you give the injection. Wash your hands prior to giving yourself an injection and use an alcohol wipe to clean the area of the injection and allow to dry prior to injecting.      If you miss a dose, take it as soon as you remember and resume the original schedule if it is within 7 days from the missed dose.  If an every 2  week dose is not administered within 7 days, wait until the next dose on the original schedule.  If a once-monthly dose is not administered within 7 days, administer the dose and start a new schedule based on this date.     Be sure to let your doctor or pharmacist know of any medication changes, including OTC and herbal supplements.     Adverse Effects  Reviewed with patient and education on management provided.     Sore Throat  Injection site pain  Itching or irritation   Flu-like symptoms  Signs of an allergic reaction     Immunizations  While there are no immunizations that you need to get specifically because you are on this drug, it is important to keep up with your recommended routine vaccinations.     Adherence and Self-Administration    Barriers to Patient Adherence and/or Self-Administration: None  Methods for Supporting Patient Adherence and/or Self-Administration: None Required     Goals of Therapy  Patient Goals of Therapy: LDL reduction   Clinical Goals or Therapeutic Targets, If Applicable: LDL Reduction      Attestation  I attest that the initiated specialty medication(s) are appropriate for the patient based on my assessment.  If the prescribed therapy is at any point deemed not appropriate based on the current or future assessments, a consultation will be initiated with the patient's specialty care provider to determine the best course of action. The revised plan of therapy will be documented along with any additional patient education provided.         The patient has been provided with the following education and any applicable administration techniques (i.e. self-injection) have been demonstrated for the therapies indicated. All questions and concerns have been addressed prior to the patient receiving the medication, and the patient has verbalized understanding of the education and any materials provided.  Additional patient education shall be provided and documented upon request by the patient,  provider or payer.      The patient would like to  from our pharmacy for the next injection. Care Coordinator to set up future refill outreaches, coordinate prescription delivery, and escalate clinical questions to pharmacist.     Thank you,    Florencia Marshall. Kacey, PharmD  05/31/24  10:49 EDT

## 2024-05-31 NOTE — PROGRESS NOTES
"   Medication Management Clinic  Lipid Management Program - PCSK9i       Carlota Jose is a 48 y.o. female referred to the Medication Management Clinic by Jamari White for clinical pharmacy and specialty pharmacy management of PCSK9i.  Carlota Jose is  treated for hyperlipidemia and currently does not take anything.  In the past, Pt reports she has tried multiple statins, however, she does not remember any names nor do they sound familiar and chart is not pulling previous statins. Patient reported that they \"attacked her liver\" and that she had \"coffee ground urine\" with one of them.  The patient denies any allergies to latex.      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, Single        Allergies  Hydrocodone    Current Medication List    Current Outpatient Medications:     albuterol sulfate  (90 Base) MCG/ACT inhaler, Inhale 2 puffs Every 4 (Four) Hours As Needed for Wheezing., Disp: 18 g, Rfl: 11    DULoxetine (CYMBALTA) 20 MG capsule, Take 3 capsules by mouth Every 12 (Twelve) Hours., Disp: , Rfl:     Evolocumab (REPATHA) solution auto-injector SureClick injection, Inject 1 mL under the skin into the appropriate area as directed Every 14 (Fourteen) Days., Disp: 2 mL, Rfl: 5    hydrOXYzine pamoate (VISTARIL) 25 MG capsule, Take 1 capsule by mouth Every 12 (Twelve) Hours., Disp: , Rfl:     lansoprazole (PREVACID) 30 MG capsule, 1 capsule Daily., Disp: , Rfl:     loratadine (CLARITIN) 10 MG tablet, Take 1 tablet by mouth Daily., Disp: , Rfl:     " metoprolol tartrate (LOPRESSOR) 25 MG tablet, Take 1 tablet by mouth twice daily, Disp: 180 tablet, Rfl: 0    ubrogepant 100 MG tablet, TAKE ONE TABLET BY MOUTH AT ONSET OF MIGRAINE. IF NO RELIEF, CAN REPEAT ONE TABLET IN 2 HOURS. NO MORE THAN 2 TABLETS IN 24 HOURS., Disp: , Rfl:     Drug Interactions  No significant drug drug interactions with Repatha according to literature.     Relevant Laboratory Values  Lab Results   Component Value Date    CHOL 249 (H) 05/15/2024    TRIG 129 05/15/2024    HDL 36 (L) 05/15/2024     (H) 05/15/2024       Medication Assessment & Plan    Patient started today on Repatha 140 mg every 2 weeks. Injection training and medication education provided.     Pt administered into the LEFT thigh. Patient was asked to remain on campus for 15 minutes following injection. Patient felt well when leaving.     Patient will need lipid panel in 6-8 weeks, order placed.     Patient will continue regular follow-up with cardiology.     Patient will  from pharmacy for subsequent fills.     Will follow-up in 6 months, or sooner if needed.     Florencia Marshall. Kacey, PharmD  5/31/2024  10:14 EDT

## 2024-05-31 NOTE — PROGRESS NOTES
Initial Education Provided for Praluent/Repatha    Patient seen in the Medication Management Clinic for initial education and injection training for PCSK9 inhibitors. The patient was introduced to services offered by [facility], including: regular assessments, refill coordination, curbside pick-up or mail order delivery options, prior authorization maintenance, and financial assistance programs as applicable. The patient was also provided with contact information for the pharmacy team. Welcome information and patient satisfaction survey to be sent by retail team with patient's initial fill.    Patient Instructions    Repatha/Praluent is used to lower LDL, or bad cholesterol, to help reduce your chance of a heart attack or stroke.  You should give your injection once every 14 days.  Your doctor will likely keep you on this medication indefinitely as long as it is working for you and not causing any adverse effects.      This medication should be kept in the refrigerator until you are ready to use it.  Once removed from the refrigerator, it is good at room temperature for 30 days.  Do not leave in your car or expose to extreme heat.  Do not shake or freeze.  Dispose the used syringe/device in a sharps container.     This injection is a subcutaneous injection, which means just under the skin.  It is important to choose an area of your skin that is not tender, bruised, cut or has scars or stretch marks.  You can inject into your abdomen (except for 2 inches around your navel), your thigh or your upper arm.  Do not administer with other drugs.   Rotate injection sites each time you give the injection. Wash your hands prior to giving yourself an injection and use an alcohol wipe to clean the area of the injection and allow to dry prior to injecting.      If you miss a dose, take it as soon as you remember and resume the original schedule if it is within 7 days from the missed dose.  If an every 2  week dose is not administered within 7 days, wait until the next dose on the original schedule.  If a once-monthly dose is not administered within 7 days, administer the dose and start a new schedule based on this date.     Be sure to let your doctor or pharmacist know of any medication changes, including OTC and herbal supplements.     Adverse Effects  Reviewed with patient and education on management provided.     Sore Throat  Injection site pain  Itching or irritation   Flu-like symptoms  Signs of an allergic reaction     Immunizations  While there are no immunizations that you need to get specifically because you are on this drug, it is important to keep up with your recommended routine vaccinations.     Adherence and Self-Administration    Barriers to Patient Adherence and/or Self-Administration: None  Methods for Supporting Patient Adherence and/or Self-Administration: None Required     Goals of Therapy  Patient Goals of Therapy: LDL reduction   Clinical Goals or Therapeutic Targets, If Applicable: LDL Reduction      Attestation  I attest that the initiated specialty medication(s) are appropriate for the patient based on my assessment.  If the prescribed therapy is at any point deemed not appropriate based on the current or future assessments, a consultation will be initiated with the patient's specialty care provider to determine the best course of action. The revised plan of therapy will be documented along with any additional patient education provided.         The patient has been provided with the following education and any applicable administration techniques (i.e. self-injection) have been demonstrated for the therapies indicated. All questions and concerns have been addressed prior to the patient receiving the medication, and the patient has verbalized understanding of the education and any materials provided.  Additional patient education shall be provided and documented upon request by the patient,  provider or payer.      The patient would like to  from our pharmacy for the next injection. Care Coordinator to set up future refill outreaches, coordinate prescription delivery, and escalate clinical questions to pharmacist.     Thank you,    Florencia Marshall. Kacey, PharmD  05/31/24  10:46 EDT

## 2024-05-31 NOTE — PROGRESS NOTES
"   Medication Management Clinic  Lipid Management Program - PCSK9i       Carlota Jose is a 48 y.o. female referred to the Medication Management Clinic by Jamari White for clinical pharmacy and specialty pharmacy management of PCSK9i.  Carlota Jose is  treated for hyperlipidemia and currently does not take anything.  In the past, Pt reports she has tried multiple statins, however, she does not remember any names nor do they sound familiar and chart is not pulling previous statins. Patient reported that they \"attacked her liver\" and that she had \"coffee ground urine\" with one of them.  The patient denies any allergies to latex.      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, Single        Allergies  Hydrocodone    Current Medication List    Current Outpatient Medications:     albuterol sulfate  (90 Base) MCG/ACT inhaler, Inhale 2 puffs Every 4 (Four) Hours As Needed for Wheezing., Disp: 18 g, Rfl: 11    DULoxetine (CYMBALTA) 60 MG capsule, Take 1 capsule by mouth Daily., Disp: , Rfl:     Evolocumab (REPATHA) solution auto-injector SureClick injection, Inject 1 mL under the skin into the appropriate area as directed Every 14 (Fourteen) Days., Disp: 2 mL, Rfl: 5    hydrOXYzine pamoate (VISTARIL) 25 MG capsule, Take 1 capsule by mouth Every 12 (Twelve) Hours., Disp: , Rfl:     lansoprazole (PREVACID) 30 MG capsule, 1 capsule Daily., Disp: , Rfl:     loratadine (CLARITIN) 10 MG tablet, Take 1 tablet by mouth Daily. (Patient not taking: Reported on " 5/31/2024), Disp: , Rfl:     metoprolol tartrate (LOPRESSOR) 25 MG tablet, Take 1 tablet by mouth twice daily, Disp: 180 tablet, Rfl: 0    ubrogepant 100 MG tablet, TAKE ONE TABLET BY MOUTH AT ONSET OF MIGRAINE. IF NO RELIEF, CAN REPEAT ONE TABLET IN 2 HOURS. NO MORE THAN 2 TABLETS IN 24 HOURS., Disp: , Rfl:     Drug Interactions  No significant drug drug interactions with Repatha according to literature.     Relevant Laboratory Values  Lab Results   Component Value Date    CHOL 249 (H) 05/15/2024    TRIG 129 05/15/2024    HDL 36 (L) 05/15/2024     (H) 05/15/2024       Medication Assessment & Plan    Patient started today on Repatha 140 mg every 2 weeks. Injection training and medication education provided.     Pt administered into the LEFT thigh. Patient was asked to remain on campus for 15 minutes following injection. Patient felt well when leaving.     Patient will need lipid panel in 6-8 weeks, order placed.     Patient will continue regular follow-up with cardiology.     Patient will  from pharmacy for subsequent fills.     Will follow-up in 6 months, or sooner if needed.     Florencia Marshall. Kacey, PharmD  5/31/2024  10:49 EDT

## 2024-06-26 ENCOUNTER — SPECIALTY PHARMACY (OUTPATIENT)
Dept: PHARMACY | Facility: HOSPITAL | Age: 49
End: 2024-06-26
Payer: COMMERCIAL

## 2024-06-26 NOTE — PROGRESS NOTES
Specialty Pharmacy Refill Coordination Note     Carlota is a 48 y.o. female contacted today regarding refills of  Repatha SureClick specialty medication(s).    Reviewed and verified with patient:       Specialty medication(s) and dose(s) confirmed: yes    Refill Questions      Flowsheet Row Most Recent Value   Changes to allergies? No   Changes to medications? No   New conditions or infections since last clinic visit No   Unplanned office visit, urgent care, ED, or hospital admission in the last 4 weeks  No   How does patient/caregiver feel medication is working? Very good   Financial problems or insurance changes  No   Since the previous refill, were any specialty medication doses or scheduled injections missed or delayed?  No   Does this patient require a clinical escalation to a pharmacist? No            Delivery Questions      Flowsheet Row Most Recent Value   Copay verified? Yes   Copay amount $5   Copay form of payment Pay at pickup                   Follow-up: 28 day(s)     Jayashree Bass, Pharmacy Technician  Specialty Pharmacy Technician

## 2024-07-18 ENCOUNTER — LAB (OUTPATIENT)
Dept: LAB | Facility: HOSPITAL | Age: 49
End: 2024-07-18
Payer: COMMERCIAL

## 2024-07-18 DIAGNOSIS — E78.5 DYSLIPIDEMIA: ICD-10-CM

## 2024-07-18 LAB
CHOLEST SERPL-MCNC: 123 MG/DL (ref 0–200)
HDLC SERPL-MCNC: 43 MG/DL (ref 40–60)
LDLC SERPL CALC-MCNC: 54 MG/DL (ref 0–100)
LDLC/HDLC SERPL: 1.16 {RATIO}
TRIGL SERPL-MCNC: 150 MG/DL (ref 0–150)
VLDLC SERPL-MCNC: 26 MG/DL (ref 5–40)

## 2024-07-18 PROCEDURE — 80061 LIPID PANEL: CPT

## 2024-07-18 PROCEDURE — 36415 COLL VENOUS BLD VENIPUNCTURE: CPT

## 2024-07-19 ENCOUNTER — SPECIALTY PHARMACY (OUTPATIENT)
Dept: PHARMACY | Facility: HOSPITAL | Age: 49
End: 2024-07-19
Payer: COMMERCIAL

## 2024-07-19 NOTE — PROGRESS NOTES
Specialty Pharmacy Refill Coordination Note     Carlota is a 48 y.o. female contacted today regarding refills of  Repatha [place] specialty medication(s).    Reviewed and verified with patient:       Specialty medication(s) and dose(s) confirmed: yes    Refill Questions      Flowsheet Row Most Recent Value   Changes to allergies? No   Changes to medications? No   New conditions or infections since last clinic visit No   Unplanned office visit, urgent care, ED, or hospital admission in the last 4 weeks  No   How does patient/caregiver feel medication is working? Very good   Financial problems or insurance changes  No   Since the previous refill, were any specialty medication doses or scheduled injections missed or delayed?  No   Does this patient require a clinical escalation to a pharmacist? No            Delivery Questions      Flowsheet Row Most Recent Value   Copay verified? Yes   Copay amount $5   Copay form of payment Pay at pickup                   Follow-up: 28 day(s)     Jayashree Bass, Pharmacy Technician  Specialty Pharmacy Technician

## 2024-08-19 ENCOUNTER — SPECIALTY PHARMACY (OUTPATIENT)
Dept: PHARMACY | Facility: HOSPITAL | Age: 49
End: 2024-08-19
Payer: COMMERCIAL

## 2024-09-04 RX ORDER — METOPROLOL TARTRATE 25 MG/1
TABLET, FILM COATED ORAL
Qty: 180 TABLET | Refills: 0 | Status: SHIPPED | OUTPATIENT
Start: 2024-09-04

## 2024-09-12 ENCOUNTER — SPECIALTY PHARMACY (OUTPATIENT)
Dept: PHARMACY | Facility: HOSPITAL | Age: 49
End: 2024-09-12
Payer: COMMERCIAL

## 2024-10-24 ENCOUNTER — SPECIALTY PHARMACY (OUTPATIENT)
Dept: PHARMACY | Facility: HOSPITAL | Age: 49
End: 2024-10-24
Payer: COMMERCIAL

## 2024-10-24 NOTE — PROGRESS NOTES
" [facility]ication Management [facility]/ [facility]ty Pharmacy Patient Management Program  Lipid Management Program - PCSK9i Reassessment     Carlota Jose is a 48 y.o. female referred by their provider, Jamari White, to the Hyperlipidemia Patient Management program offered by [facility] for Lipid Management.  A follow-up outreach was conducted, including assessment of therapy appropriateness and specialty medication education for Repatha. The patient was introduced to services offered by [facility], including: regular assessments, refill coordination, curbside pick-up or mail order delivery options, prior authorization maintenance, and financial assistance programs as applicable. The patient was also provided with contact information for the pharmacy team.     Carlota Jose is  treated for hyperlipidemia and currently does not take anything.  In the past, Pt reports she has tried multiple statins, however, she does not remember any names nor do they sound familiar and chart is not pulling previous statins. Patient reported that they \"attacked her liver\" and that she had \"coffee ground urine\" with one of them.  The patient denies any allergies to latex.        Insurance Coverage & Financial Support  [facility]     Relevant Past Medical History and Comorbidities  Relevant medical history and concomitant health conditions were discussed with the patient. The patient's chart has been reviewed for relevant past medical history and comorbid conditions and updated as necessary.  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 The patient's chart has been reviewed for  allergy information and updated as necessary.   Allergies   Allergen Reactions    Hydrocodone Shortness Of Breath and Itching     Exact reaction unknown       Allergies reviewed by Chelita Castillo, Zhane on 10/24/2024 at  3:12 PM    Relevant Laboratory Values  Relevant laboratory values were discussed with the patient. The following specialty medication dose adjustment(s) are recommended: See plan, if applicable   Lab Results   Component Value Date    CHOL 123 07/18/2024    TRIG 150 07/18/2024    HDL 43 07/18/2024    LDL 54 07/18/2024       Current Medication List  This medication list has been reviewed with the patient and evaluated for any interactions or necessary modifications/recommendations, and updated to include all prescription medications, OTC medications, and supplements the patient is currently taking.  This list reflects what is contained in the patient's profile, which has also been marked as reviewed to communicate to other providers it is the most up to date version of the patient's current medication therapy.     Current Outpatient Medications:     albuterol sulfate  (90 Base) MCG/ACT inhaler, Inhale 2 puffs Every 4 (Four) Hours As Needed for Wheezing., Disp: 18 g, Rfl: 11    DULoxetine (CYMBALTA) 60 MG capsule, Take 1 capsule by mouth Daily., Disp: , Rfl:     Evolocumab (REPATHA) solution auto-injector SureClick injection, Inject 1 mL under the skin into the appropriate area as directed Every 14 (Fourteen) Days., Disp: 2 mL, Rfl: 5    hydrOXYzine pamoate (VISTARIL) 25 MG capsule, Take 1 capsule by mouth Every 12 (Twelve) Hours., Disp: , Rfl:     lansoprazole (PREVACID) 30 MG capsule, 1 capsule Daily., Disp: , Rfl:     loratadine (CLARITIN) 10 MG tablet,  Take 1 tablet by mouth Daily., Disp: , Rfl:     metoprolol tartrate (LOPRESSOR) 25 MG tablet, Take 1 tablet by mouth twice daily, Disp: 180 tablet, Rfl: 0    ubrogepant 100 MG tablet, TAKE ONE TABLET BY MOUTH AT ONSET OF MIGRAINE. IF NO RELIEF, CAN REPEAT ONE TABLET IN 2 HOURS. NO MORE THAN 2 TABLETS IN 24 HOURS., Disp: , Rfl:     Medicines reviewed by Chelita Castillo, PharmD on 10/24/2024 at  3:13 PM    Drug Interactions  None with repatha    Goals of Therapy  Goals related to the patient's specialty therapy were discussed with the patient. The Patient Goals segment of this outreach has been reviewed and updated.   Goals Addressed Today        Specialty Pharmacy General Goal      LDL reduction             Adverse Drug Reactions  Medication tolerability: Tolerating with no to minimal ADRs  Medication plan: Continue therapy with normal follow-up  Plan for ADR Management: Addressed in Plan, if applicable    Adherence, Self-Administration, and Current Therapy Problems  Adherence related to the patient's specialty therapy was discussed with the patient. The Adherence segment of this outreach has been reviewed and updated.     Adherence Questions  Linked Medication(s) Assessed: Evolocumab (REPATHA)  On average, how many doses/injections does the patient miss per month?: 0  What are the identified reasons for non-adherence or missed doses? : no problems identified  What is the estimated medication adherence level?: %  Based on the patient/caregiver response and refill history, does this patient require an MTP to track adherence improvements?: no    Additional Barriers to Patient Self-Administration: Addressed in Plan, if applicable  Methods for Supporting Patient Self-Administration: Addressed in Plan, if applicable    Open Medication Therapy Problems  No medication therapy recommendations to display    Quality of Life Assessment   Quality of Life related to the patient's enrollment in the patient management  program and services provided was discussed with the patient. The QOL segment of this outreach has been reviewed and updated.  Quality of Life Improvement Scale: 10-Significantly better    Medication Assessment & Plan  Medication Therapy Changes: Patient started continued on repatha 140 mg SC every 2 weeks.   Welcome information and patient satisfaction survey to be sent by specialty pharmacy team with patient's initial fill.  Related Plans, Therapy Recommendations, or Therapy Problems to Be Addressed: none  Patient will continue regular follow-up with cardiology. Next 11/14/24  Patient will follow up with specialty pharmacy for next injection. Care Coordinator to set up future refill outreaches, coordinate prescription delivery, and escalate clinical questions to pharmacist.  Pharmacist to perform regular assessments no more than (6) months from the previous assessment. Will follow-up in 6 months, or sooner if needed.      Attestation  Therapeutic appropriateness: Appropriate   I attest the patient was actively involved in and has agreed to the above plan of care. If the prescribed therapy is at any point deemed not appropriate based on the current or future assessments, a consultation will be initiated with the patient's specialty care provider to determine the best course of action. The revised plan of therapy will be documented along with any required assessments and/or additional patient education provided.     Chelita Castillo, PharmD  10/24/2024  15:20 EDT

## 2024-11-06 ENCOUNTER — SPECIALTY PHARMACY (OUTPATIENT)
Dept: PHARMACY | Facility: HOSPITAL | Age: 49
End: 2024-11-06
Payer: COMMERCIAL

## 2024-11-06 NOTE — PROGRESS NOTES
Specialty Pharmacy Refill Coordination Note     Carlota is a 48 y.o. female contacted today regarding refills of  Repatha Sureclick specialty medication(s).    Reviewed and verified with patient:       Specialty medication(s) and dose(s) confirmed: yes    Refill Questions      Flowsheet Row Most Recent Value   Changes to allergies? No   Changes to medications? No   New conditions or infections since last clinic visit No   Unplanned office visit, urgent care, ED, or hospital admission in the last 4 weeks  No   How does patient/caregiver feel medication is working? Good   Financial problems or insurance changes  No   Since the previous refill, were any specialty medication doses or scheduled injections missed or delayed?  No   Does this patient require a clinical escalation to a pharmacist? No            Delivery Questions      Flowsheet Row Most Recent Value   Delivery method  at Pharmacy   Delivery address Prescription   Medication(s) being filled and delivered Evolocumab (REPATHA)   Copay verified? Yes   Copay amount $10.00   Copay form of payment Pay at pickup   Signature Required No                   Follow-up: 56 day(s)     Jayashree Bass, Pharmacy Technician  Specialty Pharmacy Technician

## 2024-11-26 ENCOUNTER — OFFICE VISIT (OUTPATIENT)
Dept: CARDIOLOGY | Facility: CLINIC | Age: 49
End: 2024-11-26
Payer: COMMERCIAL

## 2024-11-26 VITALS
SYSTOLIC BLOOD PRESSURE: 121 MMHG | HEIGHT: 60 IN | WEIGHT: 168.6 LBS | HEART RATE: 99 BPM | DIASTOLIC BLOOD PRESSURE: 80 MMHG | BODY MASS INDEX: 33.1 KG/M2 | RESPIRATION RATE: 18 BRPM | OXYGEN SATURATION: 97 %

## 2024-11-26 DIAGNOSIS — K21.9 CHEST PAIN DUE TO GERD: ICD-10-CM

## 2024-11-26 DIAGNOSIS — E78.5 DYSLIPIDEMIA: Primary | ICD-10-CM

## 2024-11-26 DIAGNOSIS — R07.9 CHEST PAIN DUE TO GERD: ICD-10-CM

## 2024-11-26 PROCEDURE — 93000 ELECTROCARDIOGRAM COMPLETE: CPT | Performed by: PHYSICIAN ASSISTANT

## 2024-11-26 PROCEDURE — 99214 OFFICE O/P EST MOD 30 MIN: CPT | Performed by: PHYSICIAN ASSISTANT

## 2024-11-26 RX ORDER — LANSOPRAZOLE 30 MG/1
30 CAPSULE, DELAYED RELEASE ORAL DAILY
Qty: 90 CAPSULE | Refills: 0 | Status: SHIPPED | OUTPATIENT
Start: 2024-11-26

## 2024-11-26 NOTE — PROGRESS NOTES
Kitty Jennings PA  Carlota Jose  1975 11/26/2024    Patient Active Problem List   Diagnosis    DARREN (obstructive sleep apnea)    Shortness of breath    Precordial pain    Syncope with no recent recurrence.    Family history of premature coronary artery disease    Palpitations    Dizziness    Sinus tachycardia seen on cardiac monitor    Budd-Chiari syndrome    Recurrent cough    Seasonal allergic rhinitis    Asthmatic bronchitis , chronic    Dyslipidemia       Dear Kitty Jennings PA:    Subjective     History of Present Illness:    Chief Complaint   Patient presents with    Dyslipidemia       Carlota Jose is a pleasant 48 y.o. female with a past medical history significant for chronic precordial pain with stress echo being unremarkable.  She herself is nondiabetic nonhypertensive and non-smoker.  She comes in today for cardiology follow-up    Kitty comes in today for routine follow-up.  She does report still having symptoms of burning sensation in her upper chest wall over her sternum.  She does contribute this mostly to GERD she is on Prevacid and she reports a long as she does not forget a dose she has no symptoms however she missed the dose 1 time she will develop this burning sensation and will persist all day long.  She denies any aggravating factors such as walking or exerting herself or changing with eating.          Allergies   Allergen Reactions    Hydrocodone Shortness Of Breath and Itching     Exact reaction unknown   :      Current Outpatient Medications:     albuterol sulfate  (90 Base) MCG/ACT inhaler, Inhale 2 puffs Every 4 (Four) Hours As Needed for Wheezing., Disp: 18 g, Rfl: 11    DULoxetine (CYMBALTA) 60 MG capsule, Take 1 capsule by mouth Daily., Disp: , Rfl:     Evolocumab (REPATHA) solution auto-injector SureClick injection, Inject 1 mL under the skin into the appropriate area as directed Every 14 (Fourteen) Days., Disp: 2 mL, Rfl: 5    hydrOXYzine pamoate (VISTARIL) 25 MG  "capsule, Take 1 capsule by mouth Every 12 (Twelve) Hours., Disp: , Rfl:     lansoprazole (PREVACID) 30 MG capsule, Take 1 capsule by mouth Daily., Disp: 90 capsule, Rfl: 0    loratadine (CLARITIN) 10 MG tablet, Take 1 tablet by mouth Daily., Disp: , Rfl:     metoprolol tartrate (LOPRESSOR) 25 MG tablet, Take 1 tablet by mouth twice daily, Disp: 180 tablet, Rfl: 0    ubrogepant 100 MG tablet, TAKE ONE TABLET BY MOUTH AT ONSET OF MIGRAINE. IF NO RELIEF, CAN REPEAT ONE TABLET IN 2 HOURS. NO MORE THAN 2 TABLETS IN 24 HOURS., Disp: , Rfl:     The following portions of the patient's history were reviewed and updated as appropriate: allergies, current medications, past family history, past medical history, past social history, past surgical history and problem list.    Social History     Tobacco Use    Smoking status: Never    Smokeless tobacco: Never   Vaping Use    Vaping status: Never Used   Substance Use Topics    Alcohol use: No    Drug use: No         Objective   Vitals:    11/26/24 1431 11/26/24 1454   BP:  121/80   BP Location:  Left arm   Pulse: 99    Resp: 18    SpO2: 97%    Weight: 76.5 kg (168 lb 9.6 oz)    Height: 152.4 cm (60\")      Body mass index is 32.93 kg/m².    ROS    Constitutional:       General: Not in acute distress.     Appearance: Healthy appearance. Well-developed and not in distress. Not diaphoretic.   Eyes:      Conjunctiva/sclera: Conjunctivae normal.      Pupils: Pupils are equal, round, and reactive to light.   HENT:      Head: Normocephalic and atraumatic.   Neck:      Vascular: No carotid bruit or JVD.   Pulmonary:      Effort: Pulmonary effort is normal. No respiratory distress.      Breath sounds: Normal breath sounds.   Cardiovascular:      Normal rate. Regular rhythm.   Edema:     Peripheral edema absent.   Skin:     General: Skin is cool.   Neurological:      Mental Status: Alert, oriented to person, place, and time and oriented to person, place and time.         Lab Results " "  Component Value Date     11/03/2023    K 4.1 11/03/2023     11/03/2023    CO2 27.8 11/03/2023    BUN 8 11/03/2023    CREATININE 0.89 11/03/2023    GLUCOSE 103 (H) 11/03/2023    CALCIUM 9.3 11/03/2023    AST 17 11/03/2023    ALT 18 11/03/2023    ALKPHOS 88 11/03/2023     No results found for: \"CKTOTAL\"  Lab Results   Component Value Date    WBC 9.09 01/19/2022    HGB 13.4 01/19/2022    HCT 42.9 01/19/2022     01/19/2022     Lab Results   Component Value Date    INR 0.92 01/19/2022     No results found for: \"MG\"  Lab Results   Component Value Date    TSH 3.491 02/04/2019    TRIG 150 07/18/2024    HDL 43 07/18/2024    LDL 54 07/18/2024      No results found for: \"BNP\"    During this visit the following were done:  Labs Reviewed []    Labs Ordered []    Radiology Reports Reviewed []    Radiology Ordered []    PCP Records Reviewed []    Referring Provider Records Reviewed []    ER Records Reviewed []    Hospital Records Reviewed []    History Obtained From Family []    Radiology Images Reviewed []    Other Reviewed []    Records Requested []         ECG 12 Lead    Date/Time: 11/26/2024 2:33 PM  Performed by: Jamari White PA-C    Authorized by: Jamari White PA-C  Comparison: compared with previous ECG from 4/2/2024  Similar to previous ECG  Rhythm: sinus rhythm  Conduction: conduction normal    Clinical impression: normal ECG        Assessment & Plan    Diagnosis Plan   1. Dyslipidemia  ECG 12 Lead      2. Chest pain due to GERD  ECG 12 Lead    Ambulatory Referral to Gastroenterology               Recommendations:  GERD  Will refer patient to GI for EGD  Refilled Prevacid  Currently I have low suspicion that these chest pains are from coronary artery disease she has no episodes as long as she takes Prevacid however if she misses 1 dose she was developed the symptoms they are unchanged with physical exertion.  However I did discuss with her that of concern for coronary disease is high we can " certainly pursue further evaluation with CT coronary angiogram.      No follow-ups on file.    As always, I appreciate very much the opportunity to participate in the cardiovascular care of your patients.      With Best Regards,    Jamari White PA-C

## 2024-12-02 RX ORDER — METOPROLOL TARTRATE 25 MG/1
TABLET, FILM COATED ORAL
Qty: 180 TABLET | Refills: 0 | Status: SHIPPED | OUTPATIENT
Start: 2024-12-02

## 2024-12-13 ENCOUNTER — OFFICE VISIT (OUTPATIENT)
Dept: GASTROENTEROLOGY | Facility: CLINIC | Age: 49
End: 2024-12-13
Payer: COMMERCIAL

## 2024-12-13 VITALS
SYSTOLIC BLOOD PRESSURE: 111 MMHG | BODY MASS INDEX: 33.38 KG/M2 | WEIGHT: 170 LBS | DIASTOLIC BLOOD PRESSURE: 71 MMHG | HEIGHT: 60 IN | HEART RATE: 106 BPM

## 2024-12-13 DIAGNOSIS — K21.9 GASTROESOPHAGEAL REFLUX DISEASE, UNSPECIFIED WHETHER ESOPHAGITIS PRESENT: Primary | ICD-10-CM

## 2024-12-13 DIAGNOSIS — R13.19 ESOPHAGEAL DYSPHAGIA: ICD-10-CM

## 2024-12-13 NOTE — PROGRESS NOTES
Date of Consultation:  2024  Referring Physician: Jamari White PA-C    Chief Complaint  GERD    Carlota Jose is a 49 y.o. female who presents today to [facility] for GERD.    HPI:   49-year-old female Jorgetz today for evaluation of GERD.  Patient states that her acid reflux is well-controlled on Prevacid 30 mg once per day.  However, if she misses 1 dose she will have acid reflux the entire day.  She has no breakthrough flares on Prevacid.  She tries to avoid fatty, fried, and greasy foods.  She does drink a lot of coffee but avoids sodas and teas.  In the past, patient endorses excessive NSAID use secondary to migraines.  However, her migraines have been less frequent now when she does not take these very often.  She notes occasional dysphagia that occurs once or twice per week.  She has not noticed that this occurs with liquids or solids.  She reports regular bowel movements with complete evacuation of her colon.  She denies unintentional weight loss, nausea, vomiting, abdominal pain, melena, hematochezia, and changes in bowel habits.  Patient has never had a colonoscopy or Cologuard performed.  However, she states that a Cologuard is being shipped to her house per her PCP.           Previous History:   Past Medical History:   Diagnosis Date    Acid reflux     Arrhythmia     Asthma     Depression     Elevated cholesterol     Heart disease     palpatations     Heart valve disease     Sleep apnea       Past Surgical History:   Procedure Laterality Date     SECTION  99; 02; 07    male, (twin boys-02), female    ENDOMETRIAL ABLATION W/ NOVASURE  2018    OVARY SURGERY Right     Removed Cyst and Ovary    SALPINGO OOPHORECTOMY Right     cyst    TUBAL ABDOMINAL LIGATION Left 2001      Social History     Socioeconomic History    Marital status:    Tobacco Use    Smoking status: Never    Smokeless tobacco: Never   Vaping Use    Vaping status:  "Never Used   Substance and Sexual Activity    Alcohol use: No    Drug use: No    Sexual activity: Never     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 18 g 11    DULoxetine (CYMBALTA) 60 MG capsule Take 1 capsule by mouth Daily.      Evolocumab (REPATHA) solution auto-injector SureClick injection Inject 1 mL under the skin into the appropriate area as directed Every 14 (Fourteen) Days. 2 mL 5    hydrOXYzine pamoate (VISTARIL) 25 MG capsule Take 1 capsule by mouth Every 12 (Twelve) Hours.      lansoprazole (PREVACID) 30 MG capsule Take 1 capsule by mouth Daily. 90 capsule 0    loratadine (CLARITIN) 10 MG tablet Take 1 tablet by mouth Daily.      metoprolol tartrate (LOPRESSOR) 25 MG tablet Take 1 tablet by mouth twice daily 180 tablet 0    ubrogepant 100 MG tablet TAKE ONE TABLET BY MOUTH AT ONSET OF MIGRAINE. IF NO RELIEF, CAN REPEAT ONE TABLET IN 2 HOURS. NO MORE THAN 2 TABLETS IN 24 HOURS.       No current facility-administered medications for this visit.       Allergies:   Allergies   Allergen Reactions    Hydrocodone Shortness Of Breath and Itching     Exact reaction unknown       Vitals:   /71   Pulse 106   Ht 152.4 cm (60\")   Wt 77.1 kg (170 lb)   BMI 33.20 kg/m²   Estimated body mass index is 33.2 kg/m² as calculated from the following:    Height as of this encounter: 152.4 cm (60\").    Weight as of this encounter: 77.1 kg (170 lb).    Carlota Jose  reports that she has never smoked. She has never used smokeless tobacco. I have educated her on the risk of diseases from using tobacco products such as cancer, COPD, and heart disease.     ROS:   Review of Systems   Constitutional: Negative.    HENT:  Positive for tinnitus.    Respiratory: Negative.     Cardiovascular: " Negative.    Gastrointestinal: Negative.         + GERD   All other systems reviewed and are negative.       Physical Exam:   Physical Exam  Vitals reviewed.   Constitutional:       General: She is not in acute distress.     Appearance: Normal appearance. She is well-groomed. She is obese. She is not toxic-appearing.   HENT:      Head: Normocephalic and atraumatic.      Mouth/Throat:      Mouth: Mucous membranes are moist.   Eyes:      Extraocular Movements: Extraocular movements intact.   Cardiovascular:      Rate and Rhythm: Normal rate and regular rhythm.      Heart sounds: Normal heart sounds. No murmur heard.  Pulmonary:      Effort: Pulmonary effort is normal. No respiratory distress.      Breath sounds: Normal breath sounds. No stridor. No wheezing or rales.   Abdominal:      General: Bowel sounds are normal. There is no distension.      Palpations: Abdomen is soft. There is no mass.      Tenderness: There is no abdominal tenderness. There is no guarding or rebound.      Hernia: No hernia is present.   Skin:     General: Skin is warm.      Coloration: Skin is not jaundiced.      Findings: No rash.   Neurological:      Mental Status: She is alert and oriented to person, place, and time.   Psychiatric:         Mood and Affect: Mood and affect normal.         Speech: Speech normal.         Behavior: Behavior normal. Behavior is cooperative.         Thought Content: Thought content normal.          Lab Results:   Lab Results   Component Value Date    WBC 9.09 01/19/2022    HGB 13.4 01/19/2022    HCT 42.9 01/19/2022    MCV 85.8 01/19/2022    RDW 13.8 01/19/2022     01/19/2022    NEUTRORELPCT 71.3 01/19/2022    LYMPHORELPCT 17.4 (L) 01/19/2022    MONORELPCT 4.1 (L) 01/19/2022    EOSRELPCT 6.5 (H) 01/19/2022    BASORELPCT 0.4 01/19/2022    NEUTROABS 6.48 01/19/2022    LYMPHSABS 1.58 01/19/2022       Lab Results   Component Value Date     11/03/2023    K 4.1 11/03/2023    CO2 27.8 11/03/2023      11/03/2023    BUN 8 11/03/2023    CREATININE 0.89 11/03/2023    EGFRIFNONA 73 01/19/2022    GLUCOSE 103 (H) 11/03/2023    CALCIUM 9.3 11/03/2023    ALKPHOS 88 11/03/2023    AST 17 11/03/2023    ALT 18 11/03/2023    BILITOT 0.4 11/03/2023    ALBUMIN 4.5 11/03/2023    PROTEINTOT 7.6 11/03/2023       Pathology:        Endoscopy:        Imaging:  CT Angiogram Head 12/03/2024     CT Angiogram Head    Result Date: 12/3/2024  Unremarkable CTA head CTA CAROTID HISTORY: Tinnitus, nonpulsatile, asymmetric or unilateral   COMPARISON: None TECHNIQUE: Thin section axial CT with IV contrast supplemented with multiplanar reconstruction under CT Angiogram protocol.   3 D reconstructions were performed on a separate workstation. NASCET criteria and technique was utilized during interpretation. FINDINGS: Aortic arch:  Arch shows no significant narrowing. Great vessel origins are widely patent. Right carotid:  No significant stenosis is seen of the cervical common or internal carotid artery. Left carotid:  No significant stenosis is seen of the cervical common or internal carotid artery. Vertebrals: Vertebral arteries are codominant.   No significant stenosis is present.   IMPRESSION:  Unremarkable CTA of the neck This study was performed using dose reduction techniques to achieve radiation exposure as low as reasonably achievable (ALARA)      Results review: During today's encounter, all relevant clinical data has been reviewed.      Assessment and Plan    1. Gastroesophageal reflux disease, unspecified whether esophagitis present (Primary)  Discussed management for GERD: encouraged weight loss, HOB elevation at night, avoidance of meals 2-3 hours before bedtime, avoid trigger foods (caffeine, alcohol, chocolate, acidic/spicy foods e.g oranges/tomatoes), and encouraged smoking cessation  Continue Prevacid.  -     Case Request; Standing  -     Follow Anesthesia Guidelines / Protocol; Future  -     Case Request    2. Esophageal  dysphagia  -     FL ESOPHAGRAM SINGLE CONTRAST; Future  -     Case Request; Standing  -     Follow Anesthesia Guidelines / Protocol; Future  -     Case Request      New Medications:   No orders of the defined types were placed in this encounter.      Discontinued Medications:   There are no discontinued medications.     Visit Diagnoses:    ICD-10-CM ICD-9-CM   1. Gastroesophageal reflux disease, unspecified whether esophagitis present  K21.9 530.81   2. Esophageal dysphagia  R13.19 787.29            Follow Up:   Return in about 8 weeks (around 2/7/2025).    The patient was in agreement with the plan and all questions were answered to patient's satisfaction.        This document has been electronically signed by Ghada Vogel PA-C   December 13, 2024 15:58 EST    Dictated Utilizing Dragon Dictation: Part of this note may be an electronic transcription/translation of spoken language to printed text using the Dragon Dictation System.    CC:  CINDI Farley Sarah Beth, PA

## 2024-12-20 ENCOUNTER — SPECIALTY PHARMACY (OUTPATIENT)
Dept: PHARMACY | Facility: HOSPITAL | Age: 49
End: 2024-12-20
Payer: COMMERCIAL

## 2024-12-20 NOTE — PROGRESS NOTES
Specialty Pharmacy Patient Management Program  Medication Management Clinic Refill Outreach      Carlota was contacted today regarding refills of her medication(s).    Specialty medication(s) and dose(s) confirmed: repatha sureclick    Refill Questions      Flowsheet Row Most Recent Value   Changes to allergies? No   Changes to medications? No   New conditions or infections since last clinic visit No   Unplanned office visit, urgent care, ED, or hospital admission in the last 4 weeks  No   How does patient/caregiver feel medication is working? Very good   Financial problems or insurance changes  No   Since the previous refill, were any specialty medication doses or scheduled injections missed or delayed?  No   Does this patient require a clinical escalation to a pharmacist? No          Delivery Questions      Flowsheet Row Most Recent Value   Delivery method  at Pharmacy   Medication(s) being filled and delivered Evolocumab (REPATHA)   Doses left of specialty medications 0   Copay verified? Yes   Copay amount $10.00   Copay form of payment Credit/debit on file   Signature Required No            Follow-Up: 56 days    Chelita Castillo, PharmD  12/20/2024  13:57 EST

## 2024-12-31 ENCOUNTER — HOSPITAL ENCOUNTER (OUTPATIENT)
Dept: GENERAL RADIOLOGY | Facility: HOSPITAL | Age: 49
Discharge: HOME | End: 2024-12-31
Payer: COMMERCIAL

## 2024-12-31 ENCOUNTER — TELEPHONE (OUTPATIENT)
Dept: CARDIOLOGY | Facility: CLINIC | Age: 49
End: 2024-12-31
Payer: COMMERCIAL

## 2024-12-31 DIAGNOSIS — R13.19 ESOPHAGEAL DYSPHAGIA: ICD-10-CM

## 2024-12-31 PROCEDURE — 74220 X-RAY XM ESOPHAGUS 1CNTRST: CPT

## 2024-12-31 NOTE — TELEPHONE ENCOUNTER
Cardiac Risk Assessment  Procedure: EGD  LS: 11/26/24  F/up:2/26/25  Echo: 2/25/22  Stress echo: 5/3/24      Form filled out and placed on Jamari's desk.          ----- Message from Jia IRVIN sent at 12/30/2024  1:19 PM EST -----  Patient is scheduled to have a EGD on 1-28-25 with Dr. Garcia and we need to obtain a cardiac clearance please.

## 2025-01-02 ENCOUNTER — TELEPHONE (OUTPATIENT)
Dept: GASTROENTEROLOGY | Facility: CLINIC | Age: 50
End: 2025-01-02
Payer: COMMERCIAL

## 2025-01-22 ENCOUNTER — OFFICE VISIT (OUTPATIENT)
Dept: PULMONOLOGY | Facility: CLINIC | Age: 50
End: 2025-01-22
Payer: COMMERCIAL

## 2025-01-22 VITALS
TEMPERATURE: 97.1 F | OXYGEN SATURATION: 96 % | BODY MASS INDEX: 32.59 KG/M2 | HEART RATE: 76 BPM | DIASTOLIC BLOOD PRESSURE: 78 MMHG | HEIGHT: 60 IN | SYSTOLIC BLOOD PRESSURE: 126 MMHG | WEIGHT: 166 LBS

## 2025-01-22 DIAGNOSIS — Z01.818 PRE-OPERATIVE EXAM: Primary | ICD-10-CM

## 2025-01-22 LAB
FEV1/FVC: 54 %
FEV1: 1.46 LITERS
FVC VOL RESPIRATORY: 2.73 LITERS

## 2025-01-22 PROCEDURE — 99214 OFFICE O/P EST MOD 30 MIN: CPT | Performed by: NURSE PRACTITIONER

## 2025-01-22 ASSESSMENT — PULMONARY FUNCTION TESTS
FVC: 2.73
FEV1/FVC: 54
FEV1: 1.46

## 2025-01-22 NOTE — PROGRESS NOTES
"Chief Complaint  Sleep Apnea and Surgery Clearance    Subjective          Carlota Jose presents to [facility] PULMONARY & CRITICAL CARE MEDICINE for   History of Present Illness    Ms. Jose is a 49 year old female with a medical history significant for asthma, depression, heart disease and sleep apnea.    She presents today for a pre-operative exam.  She reports that she is unsure if she will need to undergo surgery or of which one she will need. She is current seeing GI for problems with swallowing and her esophagus.  She states that she underwent a swallow study and has an upcoming appointment with GI for further treatment.  She denies any shortness of breath and reports that she is only using inhalers as needed.  She has a history of sleep apnea but lost a significant amount of weight after her initial test and is not using a cpap. She denies any symptoms of sleep apnea.       Objective   Vital Signs:   /78   Pulse 76   Temp 97.1 °F (36.2 °C)   Ht 152.4 cm (60\")   Wt 75.3 kg (166 lb)   SpO2 96%   BMI 32.42 kg/m²         Physical Exam      GENERAL APPEARANCE: Well developed, well nourished, alert and cooperative, and appears to be in no acute distress.    HEAD: normocephalic. Atraumatic.    EYES: PERRL, EOMI. Vision is grossly intact.    THROAT: Oral cavity and pharynx normal. No inflammation, swelling, exudate, or lesions.     NECK: Neck supple.  No thyromegaly.    CARDIAC: Normal S1 and S2. No S3, S4 or murmurs. Rhythm is regular.     RESPIRATORY:Bilateral air entry positive. Bilateral diminished breath sounds. No wheezing, crackles or rhonchi noted.    GI: Positive bowel sounds. Soft, nondistended, nontender.     MUSCULOSKELETAL: No significant deformity or joint abnormality. No edema. Peripheral pulses intact. No varicosities.    NEUROLOGICAL: Strength and sensation symmetric and intact throughout.     PSYCHIATRIC: The mental examination revealed the patient was oriented to person, " "place, and time.     Estimated body mass index is 32.42 kg/m² as calculated from the following:    Height as of this encounter: 152.4 cm (60\").    Weight as of this encounter: 75.3 kg (166 lb).        Result Review :   The following data was reviewed by: ZOE Estevez on 01/22/2025:  Common labs          5/15/2024    08:49 7/18/2024    09:36   Common Labs   Total Cholesterol 249  123    Triglycerides 129  150    HDL Cholesterol 36  43    LDL Cholesterol  189  54           PFT:Completed in office.    Low dose lung cancer screening:NA    Previous chest imaging:    Narrative & Impression   EXAMINATION: FL ESOPHAGRAM SINGLE CONTRAST-      CLINICAL INDICATION: dysphagia; R13.19-Other dysphagia        COMPARISON: None available     Total fluoroscopy time 0.73 minutes     Patient was given thick barium and a 13 mm barium tablet     No abnormality in the upper esophagus. No ulceration or web     There is narrowing in the mid esophagus with delay in passage of the 13  mm barium tablet.     No distal stricture or ulceration.     IMPRESSION:  1. Delayed passage of 13 mm barium tablet through the mid esophagus.  2. Consider direct visualization        This report was finalized on 12/31/2024 8:27 AM by Dr. Hipolito Douglas MD.       Alpha-1 antitrypsin screening:NA    STOP-Bang Score:   NA  [place] Sleepiness Scale:   NA      ABG:    pH No results found for: \"PHART\"   pO2 No results found for: \"PO2ART\"   pCO2 No results found for: \"YHD8YUM\"   HCO3 No results found for: \"MRI6WMZ\"                      Assessment and Plan    Problem List Items Addressed This Visit    None  Visit Diagnoses       Pre-operative exam    -  Primary          Carlota Jose  reports that she has never smoked. She has never used smokeless tobacco.      Continue inhalers as needed.  She is stable from a respiratory standpoint.       Spirometry was completed in office.  Test was not valid as the patient was unable to exhale for full 6 seconds.  She " denies any shortness of breath.    Patients with obstructive sleep apnea have 2-4 higher risk of perioperative complications compared with patients without obstructive sleep apnea.  Respiratory complication(desaturation, respiratory failure) are the most common.      If intubation is required, pulmonology recommends using sedatives that do not result in histamine release.  For ventilator settings if needed, recommend lung protective strategy with low lung volume   - tidal volume of 6-8 mL/kg of predicted body weight  - PEEP at 6-8 cm of water, plateau pressure < 30  Also recommend   - DVT prophylaxis  judicious use of postoperative opioids to avoid oxygen desaturation and apnea.  ncentive spirometer use post operatively to increase lung volume and to prevent basal atelectasis.  Early mobilization and adequate pain control to avoid splinting and ventilation perfusion mismatch        Complications include post-operative pneumonia, atelectasis, respiratory failure with ventilatory support, acute lung injury (aspiration pneumonitis), acute respiratory distress syndrome, and pulmonary embolism.     She will be at moderate risk for complications related to sedation and surgery.      Follow Up   Return if symptoms worsen or fail to improve.  Patient was given instructions and counseling regarding her condition or for health maintenance advice. Please see specific information pulled into the AVS if appropriate.

## 2025-01-22 NOTE — LETTER
"January 22, 2025     Ghada Vogel PA-C  [address] [facility] [place] [address]    Patient: Carlota Jose   YOB: 1975   Date of Visit: 1/22/2025     Dear Dr. Jaspreet PA-C:      If you have questions, please do not hesitate to call me. I look forward to following Carlota along with you.         Sincerely,        ZOE Estevez        CC: No Recipients      Progress Notes:  Chief Complaint  Sleep Apnea and Surgery Clearance    Subjective         Carlota Jose presents to [facility] PULMONARY & CRITICAL CARE MEDICINE for   History of Present Illness    Ms. Jose is a 49 year old female with a medical history significant for asthma, depression, heart disease and sleep apnea.    She presents today for a pre-operative exam.  She reports that she is unsure if she will need to undergo surgery or of which one she will need. She is current seeing GI for problems with swallowing and her esophagus.  She states that she underwent a swallow study and has an upcoming appointment with GI for further treatment.  She denies any shortness of breath and reports that she is only using inhalers as needed.  She has a history of sleep apnea but lost a significant amount of weight after her initial test and is not using a cpap. She denies any symptoms of sleep apnea.       Objective  Vital Signs:   /78   Pulse 76   Temp 97.1 °F (36.2 °C)   Ht 152.4 cm (60\")   Wt 75.3 kg (166 lb)   SpO2 96%   BMI 32.42 kg/m²         Physical Exam      GENERAL APPEARANCE: Well developed, well nourished, alert and cooperative, and appears to be in no acute distress.    HEAD: normocephalic. Atraumatic.    EYES: PERRL, EOMI. Vision is grossly intact.    THROAT: Oral cavity and pharynx normal. No inflammation, swelling, exudate, or lesions.     NECK: Neck supple.  No thyromegaly.    CARDIAC: Normal S1 and S2. No S3, S4 or murmurs. Rhythm is regular.     RESPIRATORY:Bilateral air entry positive. Bilateral " "diminished breath sounds. No wheezing, crackles or rhonchi noted.    GI: Positive bowel sounds. Soft, nondistended, nontender.     MUSCULOSKELETAL: No significant deformity or joint abnormality. No edema. Peripheral pulses intact. No varicosities.    NEUROLOGICAL: Strength and sensation symmetric and intact throughout.     PSYCHIATRIC: The mental examination revealed the patient was oriented to person, place, and time.     Estimated body mass index is 32.42 kg/m² as calculated from the following:    Height as of this encounter: 152.4 cm (60\").    Weight as of this encounter: 75.3 kg (166 lb).        Result Review:   The following data was reviewed by: ZOE Estevez on 01/22/2025:  Common labs          5/15/2024    08:49 7/18/2024    09:36   Common Labs   Total Cholesterol 249  123    Triglycerides 129  150    HDL Cholesterol 36  43    LDL Cholesterol  189  54           PFT:Completed in office.    Low dose lung cancer screening:NA    Previous chest imaging:    Narrative & Impression   EXAMINATION: FL ESOPHAGRAM SINGLE CONTRAST-      CLINICAL INDICATION: dysphagia; R13.19-Other dysphagia        COMPARISON: None available     Total fluoroscopy time 0.73 minutes     Patient was given thick barium and a 13 mm barium tablet     No abnormality in the upper esophagus. No ulceration or web     There is narrowing in the mid esophagus with delay in passage of the 13  mm barium tablet.     No distal stricture or ulceration.     IMPRESSION:  1. Delayed passage of 13 mm barium tablet through the mid esophagus.  2. Consider direct visualization        This report was finalized on 12/31/2024 8:27 AM by Dr. Hipolito Douglas MD.       Alpha-1 antitrypsin screening:NA    STOP-Bang Score:   NA  [employer] Sleepiness Scale:   NA      ABG:    pH No results found for: \"PHART\"   pO2 No results found for: \"PO2ART\"   pCO2 No results found for: \"YNX9UUT\"   HCO3 No results found for: \"CXI1UEH\"                      Assessment and Plan  "   Problem List Items Addressed This Visit    None  Visit Diagnoses       Pre-operative exam    -  Primary          Carlota Jose  reports that she has never smoked. She has never used smokeless tobacco.      Continue inhalers as needed.  She is stable from a respiratory standpoint.       Spirometry was completed in office.  Test was not valid as the patient was unable to exhale for full 6 seconds.  She denies any shortness of breath.    Patients with obstructive sleep apnea have 2-4 higher risk of perioperative complications compared with patients without obstructive sleep apnea.  Respiratory complication(desaturation, respiratory failure) are the most common.      If intubation is required, pulmonology recommends using sedatives that do not result in histamine release.  For ventilator settings if needed, recommend lung protective strategy with low lung volume   - tidal volume of 6-8 mL/kg of predicted body weight  - PEEP at 6-8 cm of water, plateau pressure < 30  Also recommend   - DVT prophylaxis  judicious use of postoperative opioids to avoid oxygen desaturation and apnea.  ncentive spirometer use post operatively to increase lung volume and to prevent basal atelectasis.  Early mobilization and adequate pain control to avoid splinting and ventilation perfusion mismatch        Complications include post-operative pneumonia, atelectasis, respiratory failure with ventilatory support, acute lung injury (aspiration pneumonitis), acute respiratory distress syndrome, and pulmonary embolism.     She will be at moderate risk for complications related to sedation and surgery.      Follow Up   Return if symptoms worsen or fail to improve.  Patient was given instructions and counseling regarding her condition or for health maintenance advice. Please see specific information pulled into the AVS if appropriate.

## 2025-01-28 ENCOUNTER — ANESTHESIA (OUTPATIENT)
Dept: PERIOP | Facility: HOSPITAL | Age: 50
End: 2025-01-28
Payer: COMMERCIAL

## 2025-01-28 ENCOUNTER — HOSPITAL ENCOUNTER (OUTPATIENT)
Facility: HOSPITAL | Age: 50
Setting detail: HOSPITAL OUTPATIENT SURGERY
Discharge: HOME | End: 2025-01-28
Attending: INTERNAL MEDICINE | Admitting: INTERNAL MEDICINE
Payer: COMMERCIAL

## 2025-01-28 ENCOUNTER — ANESTHESIA EVENT (OUTPATIENT)
Dept: PERIOP | Facility: HOSPITAL | Age: 50
End: 2025-01-28
Payer: COMMERCIAL

## 2025-01-28 VITALS
HEART RATE: 78 BPM | RESPIRATION RATE: 18 BRPM | HEIGHT: 60 IN | DIASTOLIC BLOOD PRESSURE: 75 MMHG | BODY MASS INDEX: 32.98 KG/M2 | SYSTOLIC BLOOD PRESSURE: 106 MMHG | TEMPERATURE: 97.9 F | OXYGEN SATURATION: 97 % | WEIGHT: 168 LBS

## 2025-01-28 DIAGNOSIS — K21.9 GASTROESOPHAGEAL REFLUX DISEASE, UNSPECIFIED WHETHER ESOPHAGITIS PRESENT: ICD-10-CM

## 2025-01-28 DIAGNOSIS — R13.19 ESOPHAGEAL DYSPHAGIA: ICD-10-CM

## 2025-01-28 LAB
B-HCG UR QL: NEGATIVE
EXPIRATION DATE: NORMAL
INTERNAL NEGATIVE CONTROL: NORMAL
INTERNAL POSITIVE CONTROL: NORMAL
Lab: NORMAL

## 2025-01-28 PROCEDURE — 25010000002 PROPOFOL 200 MG/20ML EMULSION: Performed by: NURSE ANESTHETIST, CERTIFIED REGISTERED

## 2025-01-28 PROCEDURE — C1726 CATH, BAL DIL, NON-VASCULAR: HCPCS | Performed by: INTERNAL MEDICINE

## 2025-01-28 PROCEDURE — 43249 ESOPH EGD DILATION <30 MM: CPT | Performed by: INTERNAL MEDICINE

## 2025-01-28 PROCEDURE — 25010000002 FENTANYL CITRATE (PF) 50 MCG/ML SOLUTION: Performed by: NURSE ANESTHETIST, CERTIFIED REGISTERED

## 2025-01-28 PROCEDURE — 43239 EGD BIOPSY SINGLE/MULTIPLE: CPT | Performed by: INTERNAL MEDICINE

## 2025-01-28 PROCEDURE — 81025 URINE PREGNANCY TEST: CPT | Performed by: ANESTHESIOLOGY

## 2025-01-28 PROCEDURE — 25010000002 LIDOCAINE PF 2% 2 % SOLUTION: Performed by: NURSE ANESTHETIST, CERTIFIED REGISTERED

## 2025-01-28 RX ORDER — MEPERIDINE HYDROCHLORIDE 25 MG/ML
12.5 INJECTION INTRAMUSCULAR; INTRAVENOUS; SUBCUTANEOUS
Status: DISCONTINUED | OUTPATIENT
Start: 2025-01-28 | End: 2025-01-28 | Stop reason: HOSPADM

## 2025-01-28 RX ORDER — ONDANSETRON 2 MG/ML
4 INJECTION INTRAMUSCULAR; INTRAVENOUS AS NEEDED
Status: DISCONTINUED | OUTPATIENT
Start: 2025-01-28 | End: 2025-01-28 | Stop reason: HOSPADM

## 2025-01-28 RX ORDER — SODIUM CHLORIDE 0.9 % (FLUSH) 0.9 %
10 SYRINGE (ML) INJECTION AS NEEDED
Status: DISCONTINUED | OUTPATIENT
Start: 2025-01-28 | End: 2025-01-28 | Stop reason: HOSPADM

## 2025-01-28 RX ORDER — FENTANYL CITRATE 50 UG/ML
INJECTION, SOLUTION INTRAMUSCULAR; INTRAVENOUS AS NEEDED
Status: DISCONTINUED | OUTPATIENT
Start: 2025-01-28 | End: 2025-01-28 | Stop reason: SURG

## 2025-01-28 RX ORDER — SODIUM CHLORIDE 0.9 % (FLUSH) 0.9 %
10 SYRINGE (ML) INJECTION EVERY 12 HOURS SCHEDULED
Status: DISCONTINUED | OUTPATIENT
Start: 2025-01-28 | End: 2025-01-28 | Stop reason: HOSPADM

## 2025-01-28 RX ORDER — KETOROLAC TROMETHAMINE 30 MG/ML
30 INJECTION, SOLUTION INTRAMUSCULAR; INTRAVENOUS EVERY 6 HOURS PRN
Status: DISCONTINUED | OUTPATIENT
Start: 2025-01-28 | End: 2025-01-28 | Stop reason: HOSPADM

## 2025-01-28 RX ORDER — PROPOFOL 10 MG/ML
INJECTION, EMULSION INTRAVENOUS AS NEEDED
Status: DISCONTINUED | OUTPATIENT
Start: 2025-01-28 | End: 2025-01-28 | Stop reason: SURG

## 2025-01-28 RX ORDER — MIDAZOLAM HYDROCHLORIDE 1 MG/ML
1 INJECTION, SOLUTION INTRAMUSCULAR; INTRAVENOUS
Status: DISCONTINUED | OUTPATIENT
Start: 2025-01-28 | End: 2025-01-28 | Stop reason: HOSPADM

## 2025-01-28 RX ORDER — SODIUM CHLORIDE 9 MG/ML
40 INJECTION, SOLUTION INTRAVENOUS AS NEEDED
Status: DISCONTINUED | OUTPATIENT
Start: 2025-01-28 | End: 2025-01-28 | Stop reason: HOSPADM

## 2025-01-28 RX ORDER — FENTANYL CITRATE 50 UG/ML
50 INJECTION, SOLUTION INTRAMUSCULAR; INTRAVENOUS
Status: DISCONTINUED | OUTPATIENT
Start: 2025-01-28 | End: 2025-01-28 | Stop reason: HOSPADM

## 2025-01-28 RX ORDER — IPRATROPIUM BROMIDE AND ALBUTEROL SULFATE 2.5; .5 MG/3ML; MG/3ML
3 SOLUTION RESPIRATORY (INHALATION) ONCE AS NEEDED
Status: DISCONTINUED | OUTPATIENT
Start: 2025-01-28 | End: 2025-01-28 | Stop reason: HOSPADM

## 2025-01-28 RX ORDER — LIDOCAINE HYDROCHLORIDE 20 MG/ML
INJECTION, SOLUTION EPIDURAL; INFILTRATION; INTRACAUDAL; PERINEURAL AS NEEDED
Status: DISCONTINUED | OUTPATIENT
Start: 2025-01-28 | End: 2025-01-28 | Stop reason: SURG

## 2025-01-28 RX ADMIN — LIDOCAINE HYDROCHLORIDE 100 MG: 20 INJECTION, SOLUTION EPIDURAL; INFILTRATION; INTRACAUDAL; PERINEURAL at 11:38

## 2025-01-28 RX ADMIN — FENTANYL CITRATE 100 MCG: 50 INJECTION, SOLUTION INTRAMUSCULAR; INTRAVENOUS at 11:38

## 2025-01-28 RX ADMIN — PROPOFOL 80 MG: 10 INJECTION, EMULSION INTRAVENOUS at 11:45

## 2025-01-28 RX ADMIN — PROPOFOL 80 MG: 10 INJECTION, EMULSION INTRAVENOUS at 11:40

## 2025-01-28 NOTE — ANESTHESIA POSTPROCEDURE EVALUATION
Patient: Carlota Jose    Procedure Summary       Date: 01/28/25 Room / Location: [facility] OR  /  [facility] OR    Anesthesia Start: 1138 Anesthesia Stop: 1152    Procedure: ESOPHAGOGASTRODUODENOSCOPY (Esophagus) Diagnosis:       Gastroesophageal reflux disease, unspecified whether esophagitis present      Esophageal dysphagia      (Gastroesophageal reflux disease, unspecified whether esophagitis present [K21.9])      (Esophageal dysphagia [R13.19])    Surgeons: Jia Arriaga MD Provider: Nic Clancy MD    Anesthesia Type: general ASA Status: 3            Anesthesia Type: general    Vitals  Vitals Value Taken Time   /75 01/28/25 1224   Temp 97.9 °F (36.6 °C) 01/28/25 1154   Pulse 78 01/28/25 1224   Resp 18 01/28/25 1224   SpO2 97 % 01/28/25 1224           Post Anesthesia Care and Evaluation    Patient location during evaluation: PHASE II  Patient participation: complete - patient participated  Level of consciousness: awake and alert  Pain score: 0  Pain management: adequate    Airway patency: patent  Anesthetic complications: No anesthetic complications    Cardiovascular status: acceptable  Respiratory status: acceptable  Hydration status: acceptable

## 2025-01-28 NOTE — H&P
[facility] HOSPITALIST HISTORY AND PHYSICAL    Patient Identification:  Name:  Carlota Jose  Age:  49 y.o.  Sex:  female  :  1975  MRN:  [id number]   Visit Number:  [id number]  Primary Care Physician:  Kitty Jennings PA     Chief complaint: Dysphagia (solids), GERD    History of presenting illness: Ms. Jose is a 49 y.o. female who presents today for EGD.  Patient reports chronic difficulty with GERD.  She is currently taking lansoprazole 30 mg p.o. once daily.  Patient reports GERD is controlled with lansoprazole as long as she does not miss her taking her medication.  She retains her gallbladder.  She complains of dysphagia particularly with solids.  She feels as if solids will get lodged in her midesophagus. She underwent esophagram on 2024 which showed delayed passage of 13 mm barium tablet through the mid esophagus.  EGD was recommended.  She has no other complaints today.    Review of Systems   Constitutional: Negative.    HENT:  Positive for trouble swallowing.    Eyes: Negative.    Respiratory: Negative.     Cardiovascular: Negative.    Gastrointestinal:         GERD   Endocrine: Negative.    Genitourinary: Negative.    Musculoskeletal: Negative.    Allergic/Immunologic: Negative.    Neurological: Negative.    Hematological: Negative.         Past Medical History:   Diagnosis Date    Acid reflux     Anxiety     Arrhythmia     Asthma     Depression     Elevated cholesterol     Heart disease     palpatations     Heart valve disease     Sleep apnea      Past Surgical History:   Procedure Laterality Date     SECTION  99; 02; 07    male, (twin boys-02), female    ENDOMETRIAL ABLATION W/ GILL  2018    OVARY SURGERY Right     Removed Cyst and Ovary    SALPINGO OOPHORECTOMY Right     cyst    TUBAL ABDOMINAL LIGATION Left 2001     Family History   Problem Relation Age of Onset    Cancer Mother         breast bilateral    Heart disease Mother     Obesity Mother      Breast cancer Mother 45        age 40's lt breast age 6o's rt breast    Diabetes Father     Thyroid disease Sister     Diabetes Brother     Diabetes Brother     Thyroid disease Brother     Breast cancer Maternal Grandmother     Heart disease Maternal Grandfather     Cancer Maternal Grandfather     Heart disease Paternal Grandfather     Diabetes Paternal Grandfather     Stroke Paternal Grandfather     Asthma Maternal Aunt      Social History     Socioeconomic History    Marital status:    Tobacco Use    Smoking status: Never    Smokeless tobacco: Never   Vaping Use    Vaping status: Never Used   Substance and Sexual Activity    Alcohol use: No    Drug use: No    Sexual activity: Never     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 Taking?    albuterol sulfate  (90 Base) MCG/ACT inhaler Past Week  No Yes    Inhale 2 puffs Every 4 (Four) Hours As Needed for Wheezing.    DULoxetine (CYMBALTA) 60 MG capsule 1/27/2025  Yes Yes    Take 1 capsule by mouth Daily.    Evolocumab (REPATHA) solution auto-injector SureClick injection Past Week  No Yes    Inject 1 mL under the skin into the appropriate area as directed Every 14 (Fourteen) Days.    hydrOXYzine pamoate (VISTARIL) 25 MG capsule 1/27/2025  Yes Yes    Take 1 capsule by mouth Every 12 (Twelve) Hours.    lansoprazole (PREVACID) 30 MG capsule 1/27/2025  No Yes    Take 1 capsule by mouth Daily.    loratadine (CLARITIN) 10 MG tablet 1/27/2025  Yes Yes    Take 1 tablet by mouth Daily.    metoprolol tartrate (LOPRESSOR) 25 MG tablet 1/27/2025  No Yes    Take 1 tablet by mouth twice daily    ubrogepant 100 MG tablet More than a month  Yes No    TAKE ONE TABLET BY MOUTH AT ONSET OF MIGRAINE. IF NO RELIEF, CAN REPEAT ONE TABLET IN 2 HOURS. NO MORE THAN 2 TABLETS IN 24 HOURS.          Hospital Scheduled Meds:  sodium  "chloride, 10 mL, Intravenous, Q12H    Vital Signs:     Vitals:    01/28/25 1036   BP: 106/70   BP Location: Left arm   Patient Position: Lying   Pulse: 92   Resp: 18   Temp: 98 °F (36.7 °C)   TempSrc: Temporal   SpO2: 97%   Weight: 76.2 kg (168 lb)   Height: 152.4 cm (60\")      Body mass index is 32.81 kg/m².    Physical Exam:  Constitutional:  Alert and oriented. Well developed and well nourished, in no acute distress.  HENT:  Head: Normocephalic and atraumatic.  Mouth:  Moist mucous membranes.  OP clear, mmm  Eyes:  Conjunctivae and EOM are normal.  Pupils are equal, round, and reactive to light.  No scleral icterus.  Neck:  Neck supple.  No JVD present.    Cardiovascular:  RRR, no MRG.  Pulmonary/Chest:  CTAB, unlabored.   Abdominal:  Soft.  Bowel sounds are normal.  No distension and no tenderness.   Musculoskeletal:  No edema, no tenderness, and no deformity.   Neurological:  MS as above, grossly nonfocal exam     Assessment and Plan:  Proceed with EGD for evaluation of dysphagia and GERD.     Jenise Dasilva, APRPOLO  01/28/25  10:37 EST  "

## 2025-01-28 NOTE — ANESTHESIA PREPROCEDURE EVALUATION
Anesthesia Evaluation     Patient summary reviewed and Nursing notes reviewed   no history of anesthetic complications:   NPO Solid Status: > 8 hours  NPO Liquid Status: > 8 hours           Airway   Mallampati: II  TM distance: >3 FB  Neck ROM: full  Dental - normal exam     Pulmonary     breath sounds clear to auscultation  (+) asthma,shortness of breath, sleep apnea  (-) not a smoker  Cardiovascular   Exercise tolerance: good (4-7 METS)    Rhythm: regular  Rate: normal    (+) valvular problems/murmurs, hyperlipidemia      Neuro/Psych  (+) dizziness/light headedness, syncope, psychiatric history Anxiety  GI/Hepatic/Renal/Endo    (+) GERD  (-) liver disease    Musculoskeletal (-) negative ROS    Abdominal   (+) obese    Abdomen: soft.   Substance History - negative use     OB/GYN          Other        ROS/Med Hx Other: Juaquin randolphari and NOT Nadeem Chiari                Anesthesia Plan    ASA 3     general     intravenous induction     Anesthetic plan, risks, benefits, and alternatives have been provided, discussed and informed consent has been obtained with: patient.  Pre-procedure education provided  Use of blood products discussed with  Consented to blood products.    Plan discussed with CRNA.    CODE STATUS:

## 2025-01-29 LAB — REF LAB TEST METHOD: NORMAL

## 2025-01-30 NOTE — PROGRESS NOTES
At the time of your recent upper endoscopy, biopsies were taken of the esophagus.  Biopsies did reveal scant chronic inflammation from acid reflux.  Please continue lansoprazole (Prevacid).  Your esophagus was dilated with a 20 mm balloon.  This is our largest balloon.  The dilatation site was examined and showed no change indicating no narrowing.  Biopsies of the stomach were negative for H. pylori gastritis.  Please keep your follow-up appointment.

## 2025-02-05 ENCOUNTER — SPECIALTY PHARMACY (OUTPATIENT)
Dept: PHARMACY | Facility: HOSPITAL | Age: 50
End: 2025-02-05
Payer: COMMERCIAL

## 2025-02-05 NOTE — PROGRESS NOTES
Specialty Pharmacy Refill Coordination Note     Carlota is a 49 y.o. female contacted today regarding refills of  Repatha SureClick specialty medication(s).    Reviewed and verified with patient:       Specialty medication(s) and dose(s) confirmed: yes    Refill Questions      Flowsheet Row Most Recent Value   Changes to allergies? No   Changes to medications? No   New conditions or infections since last clinic visit No   Unplanned office visit, urgent care, ED, or hospital admission in the last 4 weeks  No   How does patient/caregiver feel medication is working? Good   Financial problems or insurance changes  No   Since the previous refill, were any specialty medication doses or scheduled injections missed or delayed?  No   Does this patient require a clinical escalation to a pharmacist? No            Delivery Questions      Flowsheet Row Most Recent Value   Delivery method  at Pharmacy   Delivery address Prescription   Medication(s) being filled and delivered Evolocumab (REPATHA)   Copay verified? Yes   Copay amount $30   Copay form of payment Pay at pickup   Signature Required No                   Follow-up: 56 day(s)     Jayashree Bass, Pharmacy Technician  Specialty Pharmacy Technician

## 2025-02-19 RX ORDER — LANSOPRAZOLE 30 MG/1
30 CAPSULE, DELAYED RELEASE ORAL DAILY
Qty: 90 CAPSULE | Refills: 0 | Status: SHIPPED | OUTPATIENT
Start: 2025-02-19

## 2025-02-26 ENCOUNTER — OFFICE VISIT (OUTPATIENT)
Dept: CARDIOLOGY | Facility: CLINIC | Age: 50
End: 2025-02-26
Payer: COMMERCIAL

## 2025-02-26 VITALS
HEART RATE: 96 BPM | BODY MASS INDEX: 33.06 KG/M2 | OXYGEN SATURATION: 97 % | HEIGHT: 60 IN | SYSTOLIC BLOOD PRESSURE: 112 MMHG | DIASTOLIC BLOOD PRESSURE: 75 MMHG | WEIGHT: 168.4 LBS

## 2025-02-26 DIAGNOSIS — E78.5 DYSLIPIDEMIA: ICD-10-CM

## 2025-02-26 DIAGNOSIS — R07.2 PRECORDIAL PAIN: Primary | ICD-10-CM

## 2025-02-26 PROCEDURE — 99213 OFFICE O/P EST LOW 20 MIN: CPT | Performed by: PHYSICIAN ASSISTANT

## 2025-02-26 NOTE — PROGRESS NOTES
Kitty Jennings PA  Carlota Jose  1975 02/26/2025    Patient Active Problem List   Diagnosis    DARREN (obstructive sleep apnea)    Shortness of breath    Precordial pain    Syncope with no recent recurrence.    Family history of premature coronary artery disease    Palpitations    Dizziness    Sinus tachycardia seen on cardiac monitor    Budd-Chiari syndrome    Recurrent cough    Seasonal allergic rhinitis    Asthmatic bronchitis , chronic    Dyslipidemia    Gastroesophageal reflux disease    Esophageal dysphagia       Dear Kitty Jennings PA:    Subjective     History of Present Illness:    Chief Complaint   Patient presents with    Follow-up     routine       Carlota Jose is a pleasant 49 y.o. female with a past medical history significant for chronic precordial pain with stress echo being unremarkable. She herself is nondiabetic nonhypertensive and non-smoker. She comes in today for cardiology follow-up       Kitty reports that she has been doing well she reports along she does not miss any dosages of her Prevacid she has no chest pains whatsoever.  She recently had further workup by GI which did show evidence of esophageal narrowing as well as inflammation.  She denies any shortness of breath, dizziness, syncope                Allergies   Allergen Reactions    Hydrocodone Shortness Of Breath and Itching     Exact reaction unknown   :      Current Outpatient Medications:     albuterol sulfate  (90 Base) MCG/ACT inhaler, Inhale 2 puffs Every 4 (Four) Hours As Needed for Wheezing., Disp: 18 g, Rfl: 11    DULoxetine (CYMBALTA) 60 MG capsule, Take 1 capsule by mouth Daily., Disp: , Rfl:     Evolocumab (REPATHA) solution auto-injector SureClick injection, Inject 1 mL under the skin into the appropriate area as directed Every 14 (Fourteen) Days., Disp: 2 mL, Rfl: 5    hydrOXYzine pamoate (VISTARIL) 25 MG capsule, Take 1 capsule by mouth Every 12 (Twelve) Hours., Disp: , Rfl:     lansoprazole  "(PREVACID) 30 MG capsule, Take 1 capsule by mouth once daily, Disp: 90 capsule, Rfl: 0    loratadine (CLARITIN) 10 MG tablet, Take 1 tablet by mouth Daily., Disp: , Rfl:     metoprolol tartrate (LOPRESSOR) 25 MG tablet, Take 1 tablet by mouth twice daily, Disp: 180 tablet, Rfl: 0    ubrogepant 100 MG tablet, TAKE ONE TABLET BY MOUTH AT ONSET OF MIGRAINE. IF NO RELIEF, CAN REPEAT ONE TABLET IN 2 HOURS. NO MORE THAN 2 TABLETS IN 24 HOURS., Disp: , Rfl:     The following portions of the patient's history were reviewed and updated as appropriate: allergies, current medications, past family history, past medical history, past social history, past surgical history and problem list.    Social History     Tobacco Use    Smoking status: Never    Smokeless tobacco: Never   Vaping Use    Vaping status: Never Used   Substance Use Topics    Alcohol use: No    Drug use: No         Objective   Vitals:    02/26/25 1418   BP: 112/75   Pulse: 96   SpO2: 97%   Weight: 76.4 kg (168 lb 6.4 oz)   Height: 152.4 cm (60\")     Body mass index is 32.89 kg/m².    ROS    Constitutional:       General: Not in acute distress.     Appearance: Healthy appearance. Well-developed and not in distress. Not diaphoretic.   Eyes:      Conjunctiva/sclera: Conjunctivae normal.      Pupils: Pupils are equal, round, and reactive to light.   HENT:      Head: Normocephalic and atraumatic.   Neck:      Vascular: No carotid bruit or JVD.   Pulmonary:      Effort: Pulmonary effort is normal. No respiratory distress.      Breath sounds: Normal breath sounds.   Cardiovascular:      Normal rate. Regular rhythm.   Edema:     Peripheral edema absent.   Skin:     General: Skin is cool.   Neurological:      Mental Status: Alert, oriented to person, place, and time and oriented to person, place and time.         Lab Results   Component Value Date     11/03/2023    K 4.1 11/03/2023     11/03/2023    CO2 27.8 11/03/2023    BUN 8 11/03/2023    CREATININE 0.89 " "11/03/2023    GLUCOSE 103 (H) 11/03/2023    CALCIUM 9.3 11/03/2023    AST 17 11/03/2023    ALT 18 11/03/2023    ALKPHOS 88 11/03/2023     No results found for: \"CKTOTAL\"  Lab Results   Component Value Date    WBC 9.09 01/19/2022    HGB 13.4 01/19/2022    HCT 42.9 01/19/2022     01/19/2022     Lab Results   Component Value Date    INR 0.92 01/19/2022     No results found for: \"MG\"  Lab Results   Component Value Date    TSH 3.491 02/04/2019    TRIG 150 07/18/2024    HDL 43 07/18/2024    LDL 54 07/18/2024      No results found for: \"BNP\"    During this visit the following were done:  Labs Reviewed []    Labs Ordered []    Radiology Reports Reviewed []    Radiology Ordered []    PCP Records Reviewed []    Referring Provider Records Reviewed []    ER Records Reviewed []    Hospital Records Reviewed []    History Obtained From Family []    Radiology Images Reviewed []    Other Reviewed []    Records Requested []       Procedures    Assessment & Plan    Diagnosis Plan   1. Precordial pain        2. Dyslipidemia                 Recommendations:  Precordial pain  Again I suspect GI etiology so far she has found fairly significant narrowing of her esophagus as well as chronic inflammation.  So long as she takes her Prevacid she has no symptoms however when she misses a dose she develops symptoms.  She is nondiabetic and non-smoker further lowering her risk for CAD.  Ultimately with shared decision making she agreed to proceed with continued monitoring and likely symptoms are from GI etiology.    No follow-ups on file.    As always, I appreciate very much the opportunity to participate in the cardiovascular care of your patients.      With Best Regards,    Jamari White PA-C          "

## 2025-03-03 RX ORDER — METOPROLOL TARTRATE 25 MG/1
TABLET, FILM COATED ORAL
Qty: 180 TABLET | Refills: 0 | Status: SHIPPED | OUTPATIENT
Start: 2025-03-03

## 2025-03-14 ENCOUNTER — OFFICE VISIT (OUTPATIENT)
Dept: GASTROENTEROLOGY | Facility: CLINIC | Age: 50
End: 2025-03-14
Payer: COMMERCIAL

## 2025-03-14 VITALS
DIASTOLIC BLOOD PRESSURE: 81 MMHG | WEIGHT: 169.4 LBS | BODY MASS INDEX: 33.26 KG/M2 | HEIGHT: 60 IN | HEART RATE: 108 BPM | SYSTOLIC BLOOD PRESSURE: 122 MMHG

## 2025-03-14 DIAGNOSIS — R13.19 ESOPHAGEAL DYSPHAGIA: ICD-10-CM

## 2025-03-14 DIAGNOSIS — K21.9 GASTROESOPHAGEAL REFLUX DISEASE, UNSPECIFIED WHETHER ESOPHAGITIS PRESENT: Primary | ICD-10-CM

## 2025-03-14 PROCEDURE — 99213 OFFICE O/P EST LOW 20 MIN: CPT

## 2025-03-14 RX ORDER — LANSOPRAZOLE 30 MG/1
30 CAPSULE, DELAYED RELEASE ORAL DAILY
Qty: 90 CAPSULE | Refills: 3 | Status: SHIPPED | OUTPATIENT
Start: 2025-03-14

## 2025-03-14 RX ORDER — LANSOPRAZOLE 30 MG/1
30 CAPSULE, DELAYED RELEASE ORAL DAILY
Qty: 90 CAPSULE | Refills: 0 | Status: SHIPPED | OUTPATIENT
Start: 2025-03-14 | End: 2025-03-14

## 2025-03-14 NOTE — PROGRESS NOTES
Chief Complaint  GERD    Carlota Jose is a 49 y.o. female who presents today to [facility] for GERD.    HPI:   49-year-old female presents today for evaluation of GERD. Patient states that her acid reflux is well-controlled on Prevacid 30 mg once per day. However, if she misses 1 dose she will have acid reflux the entire day. She has no breakthrough flares on Prevacid. She tries to avoid fatty, fried, and greasy foods. She does drink a lot of coffee but avoids sodas and teas. In the past, patient endorses excessive NSAID use secondary to migraines. However, her migraines have been less frequent now when she does not take these very often. She notes occasional dysphagia that occurs once or twice per week. She has not noticed that this occurs with liquids or solids. She reports regular bowel movements with complete evacuation of her colon. She denies unintentional weight loss, nausea, vomiting, abdominal pain, melena, hematochezia, and changes in bowel habits.  Patient reports that her Cologuard was done and was negative.  Patient had an esophagram performed on 12/31/2024 and noted delayed passage of the 13 mm barium throughout the midesophagus.  EGD performed by Dr. Garcia on 1/28/2025.  This was notable for normal esophagus, balloon dilator was performed to 20 mm, dilation site/showed no change, small hiatal hernia, patchy mildly erythematous mucosa without bleeding in the gastric antrum, duodenum was normal.           Interval History:    Today, patient reports that she has been doing better.  She reports that she drinks 1 cup of coffee per day but is trying to reduce this.  She has not had any more dysphagia since EGD.  Her GERD is well-controlled on Prevacid 30 mg once per day.  She denies unintentional weight loss, nausea, vomiting, dysphagia, GERD, abdominal pain, change in bowel habits, melena, or hematochezia.    Previous History:   Past Medical History:    Diagnosis Date    Acid reflux     Anxiety     Arrhythmia     Asthma     Depression     Elevated cholesterol     Heart disease     palpatations     Heart valve disease     Sleep apnea       Past Surgical History:   Procedure Laterality Date     SECTION  99; 02; 07    male, (twin boys-02), female    ENDOMETRIAL ABLATION W/ NOVASURE  2018    ENDOSCOPY N/A 2025    Procedure: ESOPHAGOGASTRODUODENOSCOPY;  Surgeon: Jia Arriaga MD;  Location: [facility];  Service: Gastroenterology;  Laterality: N/A;  pt dilated to 20mm per MD Garcia    OVARY SURGERY Right     Removed Cyst and Ovary    SALPINGO OOPHORECTOMY Right     cyst    TUBAL ABDOMINAL LIGATION Left 2001      Social History     Socioeconomic History    Marital status:    Tobacco Use    Smoking status: Never    Smokeless tobacco: Never   Vaping Use    Vaping status: Never Used   Substance and Sexual Activity    Alcohol use: No    Drug use: No    Sexual activity: Never     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 18 g 11    DULoxetine (CYMBALTA) 60 MG capsule Take 1 capsule by mouth Daily.      Evolocumab (REPATHA) solution auto-injector SureClick injection Inject 1 mL under the skin into the appropriate area as directed Every 14 (Fourteen) Days. 2 mL 5    hydrOXYzine pamoate (VISTARIL) 25 MG capsule Take 1 capsule by mouth Every 12 (Twelve) Hours.      lansoprazole (PREVACID) 30 MG capsule Take 1 capsule by mouth Daily. 90 capsule 3    loratadine (CLARITIN) 10 MG tablet Take 1 tablet by mouth Daily.      metoprolol tartrate (LOPRESSOR) 25 MG tablet Take 1 tablet by mouth twice daily 180 tablet 0    ubrogepant 100 MG tablet TAKE ONE TABLET BY MOUTH AT ONSET OF MIGRAINE. IF NO RELIEF, CAN REPEAT  "ONE TABLET IN 2 HOURS. NO MORE THAN 2 TABLETS IN 24 HOURS.       No current facility-administered medications for this visit.       Allergies:   Allergies   Allergen Reactions    Hydrocodone Shortness Of Breath and Itching     Exact reaction unknown       Vitals:   /81   Pulse 108   Ht 152.4 cm (60\")   Wt 76.8 kg (169 lb 6.4 oz)   BMI 33.08 kg/m²   Estimated body mass index is 33.08 kg/m² as calculated from the following:    Height as of this encounter: 152.4 cm (60\").    Weight as of this encounter: 76.8 kg (169 lb 6.4 oz).    ROS:   Review of Systems   HENT: Negative.     Cardiovascular: Negative.    Gastrointestinal: Negative.    All other systems reviewed and are negative.       Physical Exam:   Physical Exam  Vitals reviewed.   Constitutional:       General: She is not in acute distress.     Appearance: Normal appearance. She is well-groomed. She is obese. She is not toxic-appearing.   HENT:      Head: Normocephalic and atraumatic.      Nose: Nose normal.      Mouth/Throat:      Mouth: Mucous membranes are moist.   Eyes:      Extraocular Movements: Extraocular movements intact.   Cardiovascular:      Rate and Rhythm: Normal rate and regular rhythm.      Heart sounds: Normal heart sounds. No murmur heard.  Pulmonary:      Effort: Pulmonary effort is normal. No respiratory distress.      Breath sounds: Normal breath sounds. No stridor. No wheezing or rales.   Abdominal:      General: Bowel sounds are normal. There is no distension.      Palpations: Abdomen is soft. There is no mass.      Tenderness: There is no abdominal tenderness. There is no guarding or rebound.      Hernia: No hernia is present.   Skin:     General: Skin is warm.      Coloration: Skin is not jaundiced.      Findings: No rash.   Neurological:      Mental Status: She is alert and oriented to person, place, and time.   Psychiatric:         Mood and Affect: Mood and affect normal.         Speech: Speech normal.         Behavior: " Behavior normal. Behavior is cooperative.         Thought Content: Thought content normal.          Lab Results:   Lab Results   Component Value Date    WBC 9.09 01/19/2022    HGB 13.4 01/19/2022    HCT 42.9 01/19/2022    MCV 85.8 01/19/2022    RDW 13.8 01/19/2022     01/19/2022    NEUTRORELPCT 71.3 01/19/2022    LYMPHORELPCT 17.4 (L) 01/19/2022    MONORELPCT 4.1 (L) 01/19/2022    EOSRELPCT 6.5 (H) 01/19/2022    BASORELPCT 0.4 01/19/2022    NEUTROABS 6.48 01/19/2022    LYMPHSABS 1.58 01/19/2022       Lab Results   Component Value Date     11/03/2023    K 4.1 11/03/2023    CO2 27.8 11/03/2023     11/03/2023    BUN 8 11/03/2023    CREATININE 0.89 11/03/2023    EGFRIFNONA 73 01/19/2022    GLUCOSE 103 (H) 11/03/2023    CALCIUM 9.3 11/03/2023    ALKPHOS 88 11/03/2023    AST 17 11/03/2023    ALT 18 11/03/2023    BILITOT 0.4 11/03/2023    ALBUMIN 4.5 11/03/2023    PROTEINTOT 7.6 11/03/2023       Pathology:        Endoscopy:        Imaging:   FL ESOPHAGRAM SINGLE CONTRAST  Result Date: 12/31/2024  1. Delayed passage of 13 mm barium tablet through the mid esophagus. 2. Consider direct visualization   This report was finalized on 12/31/2024 8:27 AM by Dr. Hipolito Douglas MD.      CT Angiogram Head  Result Date: 12/3/2024  Unremarkable CTA head CTA CAROTID HISTORY: Tinnitus, nonpulsatile, asymmetric or unilateral   COMPARISON: None TECHNIQUE: Thin section axial CT with IV contrast supplemented with multiplanar reconstruction under CT Angiogram protocol.   3 D reconstructions were performed on a separate workstation. NASCET criteria and technique was utilized during interpretation. FINDINGS: Aortic arch:  Arch shows no significant narrowing. Great vessel origins are widely patent. Right carotid:  No significant stenosis is seen of the cervical common or internal carotid artery. Left carotid:  No significant stenosis is seen of the cervical common or internal carotid artery. Vertebrals: Vertebral arteries are  codominant.   No significant stenosis is present.   IMPRESSION:  Unremarkable CTA of the neck This study was performed using dose reduction techniques to achieve radiation exposure as low as reasonably achievable (ALARA)         Results review: During today's encounter, all relevant clinical data has been reviewed.      Assessment and Plan    1. Gastroesophageal reflux disease, unspecified whether esophagitis present (Primary)  Discussed management for GERD: encouraged weight loss, HOB elevation at night, avoidance of meals 2-3 hours before bedtime, avoid trigger foods (caffeine, alcohol, chocolate, acidic/spicy foods e.g oranges/tomatoes), and encouraged smoking cessation  Well-controlled on Prevacid 30 mg once daily.  -     lansoprazole (PREVACID) 30 MG capsule; Take 1 capsule by mouth Daily.  Dispense: 90 capsule; Refill: 3    2. Esophageal dysphagia  Currently resolved.      New Medications:   New Medications Ordered This Visit   Medications    lansoprazole (PREVACID) 30 MG capsule     Sig: Take 1 capsule by mouth Daily.     Dispense:  90 capsule     Refill:  3       Discontinued Medications:   Medications Discontinued During This Encounter   Medication Reason    lansoprazole (PREVACID) 30 MG capsule Reorder    lansoprazole (PREVACID) 30 MG capsule         Visit Diagnoses:    ICD-10-CM ICD-9-CM   1. Gastroesophageal reflux disease, unspecified whether esophagitis present  K21.9 530.81   2. Esophageal dysphagia  R13.19 787.29            Follow Up:   Return in about 1 year (around 3/14/2026).    The patient was in agreement with the plan and all questions were answered to patient's satisfaction.        This document has been electronically signed by Ghada Vogel PA-C   March 14, 2025 15:58 EDT    Dictated Utilizing Dragon Dictation: Part of this note may be an electronic transcription/translation of spoken language to printed text using the Dragon Dictation System.    CC:  No ref. provider found  Kitty Jennings  JENNA Saha

## 2025-03-18 ENCOUNTER — SPECIALTY PHARMACY (OUTPATIENT)
Dept: PHARMACY | Facility: HOSPITAL | Age: 50
End: 2025-03-18
Payer: COMMERCIAL

## 2025-03-24 ENCOUNTER — SPECIALTY PHARMACY (OUTPATIENT)
Dept: PHARMACY | Facility: HOSPITAL | Age: 50
End: 2025-03-24
Payer: COMMERCIAL

## 2025-03-24 NOTE — PROGRESS NOTES
Specialty Pharmacy Patient Management Program  Medication Management Clinic Refill Outreach      Carlota was contacted today regarding refills of her medication(s).    Specialty medication(s) and dose(s) confirmed: repatha sureclick    Refill Questions      Flowsheet Row Most Recent Value   Changes to allergies? No   Changes to medications? No   New conditions or infections since last clinic visit No   Unplanned office visit, urgent care, ED, or hospital admission in the last 4 weeks  No   How does patient/caregiver feel medication is working? Very good   Financial problems or insurance changes  No   Since the previous refill, were any specialty medication doses or scheduled injections missed or delayed?  No   Does this patient require a clinical escalation to a pharmacist? No          Delivery Questions      Flowsheet Row Most Recent Value   Delivery method  at Pharmacy   Copay verified? Yes   Copay amount $30.00   Copay form of payment No copayment ($0)   Signature Required No            Follow-Up: 56 days    Chelita Castillo, PharmD  3/24/2025  14:02 EDT

## 2025-04-23 ENCOUNTER — HOSPITAL ENCOUNTER (OUTPATIENT)
Dept: MAMMOGRAPHY | Facility: HOSPITAL | Age: 50
Discharge: HOME | End: 2025-04-23
Admitting: PHYSICIAN ASSISTANT
Payer: COMMERCIAL

## 2025-04-23 DIAGNOSIS — Z12.31 VISIT FOR SCREENING MAMMOGRAM: ICD-10-CM

## 2025-04-23 PROCEDURE — 77067 SCR MAMMO BI INCL CAD: CPT

## 2025-04-23 PROCEDURE — 77063 BREAST TOMOSYNTHESIS BI: CPT

## 2025-04-24 PROCEDURE — 77063 BREAST TOMOSYNTHESIS BI: CPT | Performed by: RADIOLOGY

## 2025-04-24 PROCEDURE — 77067 SCR MAMMO BI INCL CAD: CPT | Performed by: RADIOLOGY

## 2025-05-02 NOTE — TELEPHONE ENCOUNTER
----- Message from Ghada Vogel sent at 12/31/2024  8:36 AM EST -----  Please let patient know that esophagram was notable for delayed passage of the tablet through the mid esophagus.  Please have her keep appointment for EGD.  
Pt. Seen via [employer].  
Yes

## 2025-05-05 ENCOUNTER — SPECIALTY PHARMACY (OUTPATIENT)
Dept: PHARMACY | Facility: HOSPITAL | Age: 50
End: 2025-05-05
Payer: COMMERCIAL

## 2025-05-27 RX ORDER — METOPROLOL TARTRATE 25 MG/1
25 TABLET, FILM COATED ORAL 2 TIMES DAILY
Qty: 180 TABLET | Refills: 0 | Status: SHIPPED | OUTPATIENT
Start: 2025-05-27

## 2025-08-14 ENCOUNTER — TELEPHONE (OUTPATIENT)
Dept: PHARMACY | Facility: HOSPITAL | Age: 50
End: 2025-08-14
Payer: COMMERCIAL

## (undated) DEVICE — THE BITE BLOCK MAXI, LATEX FREE STRAP IS USED TO PROTECT THE ENDOSCOPE INSERTION TUBE FROM BEING BITTEN BY THE PATIENT.

## (undated) DEVICE — DEV INFL ALLIANCE2 SYS

## (undated) DEVICE — Device

## (undated) DEVICE — ESOPHAGEAL BALLOON DILATATION CATHETER: Brand: CRE FIXED WIRE

## (undated) DEVICE — ENDOGATOR TUBING FOR ENDOGATOR EGP-100 IRRIGATION PUMP,OLYMPUS OFP PUMP, OLYMPUS AFU-100 PUMP AND ERBE EIP2 PUMP: Brand: ENDOGATOR